# Patient Record
Sex: MALE | Race: WHITE | NOT HISPANIC OR LATINO | Employment: OTHER | URBAN - NONMETROPOLITAN AREA
[De-identification: names, ages, dates, MRNs, and addresses within clinical notes are randomized per-mention and may not be internally consistent; named-entity substitution may affect disease eponyms.]

---

## 2023-03-27 DIAGNOSIS — I10 HYPERTENSION, UNSPECIFIED TYPE: ICD-10-CM

## 2023-03-27 RX ORDER — VALSARTAN AND HYDROCHLOROTHIAZIDE 160; 12.5 MG/1; MG/1
1 TABLET, FILM COATED ORAL DAILY
COMMUNITY
Start: 2021-09-15 | End: 2023-03-27 | Stop reason: SDUPTHER

## 2023-03-27 RX ORDER — VALSARTAN AND HYDROCHLOROTHIAZIDE 160; 12.5 MG/1; MG/1
1 TABLET, FILM COATED ORAL DAILY
Qty: 30 TABLET | Refills: 0 | Status: SHIPPED | OUTPATIENT
Start: 2023-03-27 | End: 2023-04-06 | Stop reason: SDUPTHER

## 2023-03-28 DIAGNOSIS — E11.69 TYPE 2 DIABETES MELLITUS WITH OTHER SPECIFIED COMPLICATION, UNSPECIFIED WHETHER LONG TERM INSULIN USE (MULTI): ICD-10-CM

## 2023-03-28 RX ORDER — BLOOD SUGAR DIAGNOSTIC
STRIP MISCELLANEOUS
COMMUNITY
End: 2023-03-28 | Stop reason: SDUPTHER

## 2023-03-28 RX ORDER — INSULIN DETEMIR 100 [IU]/ML
INJECTION, SOLUTION SUBCUTANEOUS
COMMUNITY
End: 2023-04-10 | Stop reason: SDUPTHER

## 2023-03-28 RX ORDER — ATORVASTATIN CALCIUM 20 MG/1
20 TABLET, FILM COATED ORAL NIGHTLY
COMMUNITY
End: 2023-04-05 | Stop reason: SDUPTHER

## 2023-03-28 RX ORDER — ASPIRIN 81 MG
325 TABLET,CHEWABLE ORAL DAILY
COMMUNITY
Start: 2022-09-29 | End: 2023-06-21 | Stop reason: WASHOUT

## 2023-03-28 RX ORDER — METFORMIN HYDROCHLORIDE 1000 MG/1
TABLET ORAL
COMMUNITY
End: 2023-04-05 | Stop reason: SDUPTHER

## 2023-03-28 RX ORDER — BLOOD SUGAR DIAGNOSTIC
STRIP MISCELLANEOUS
Qty: 100 STRIP | Refills: 2 | Status: SHIPPED | OUTPATIENT
Start: 2023-03-28 | End: 2023-06-21 | Stop reason: SDUPTHER

## 2023-03-28 RX ORDER — LISINOPRIL 40 MG/1
1 TABLET ORAL DAILY
COMMUNITY
End: 2023-06-21 | Stop reason: WASHOUT

## 2023-03-28 RX ORDER — PEN NEEDLE, DIABETIC 29 G X1/2"
NEEDLE, DISPOSABLE MISCELLANEOUS
COMMUNITY
Start: 2022-09-11 | End: 2023-04-05 | Stop reason: SDUPTHER

## 2023-03-28 RX ORDER — INSULIN LISPRO 100 [IU]/ML
INJECTION, SOLUTION INTRAVENOUS; SUBCUTANEOUS
COMMUNITY
End: 2023-12-21 | Stop reason: SDUPTHER

## 2023-04-04 PROBLEM — M54.50 LOWER BACK PAIN: Status: ACTIVE | Noted: 2023-04-04

## 2023-04-04 PROBLEM — G47.00 INSOMNIA: Status: ACTIVE | Noted: 2023-04-04

## 2023-04-04 PROBLEM — I20.89 EXERTIONAL ANGINA (CMS-HCC): Status: ACTIVE | Noted: 2023-04-04

## 2023-04-04 PROBLEM — G89.29 CHRONIC BACK PAIN: Status: ACTIVE | Noted: 2023-04-04

## 2023-04-04 PROBLEM — M54.2 NECK PAIN: Status: ACTIVE | Noted: 2023-04-04

## 2023-04-04 PROBLEM — M19.90 OA (OSTEOARTHRITIS): Status: ACTIVE | Noted: 2023-04-04

## 2023-04-04 PROBLEM — N52.9 ERECTILE DYSFUNCTION: Status: ACTIVE | Noted: 2023-04-04

## 2023-04-04 PROBLEM — C61 PROSTATE CANCER (MULTI): Status: ACTIVE | Noted: 2023-04-04

## 2023-04-04 PROBLEM — E11.49 DIABETES WITH NEUROLOGIC COMPLICATIONS (MULTI): Status: ACTIVE | Noted: 2023-04-04

## 2023-04-04 PROBLEM — N39.0 URINARY TRACT INFECTION: Status: ACTIVE | Noted: 2023-04-04

## 2023-04-04 PROBLEM — M72.0 DUPUYTREN'S CONTRACTURE OF HAND: Status: ACTIVE | Noted: 2023-04-04

## 2023-04-04 PROBLEM — R39.9 URINARY SYMPTOM OR SIGN: Status: ACTIVE | Noted: 2023-04-04

## 2023-04-04 PROBLEM — E87.5 HYPERKALEMIA: Status: ACTIVE | Noted: 2023-04-04

## 2023-04-04 PROBLEM — J30.9 ALLERGIC RHINITIS: Status: ACTIVE | Noted: 2023-04-04

## 2023-04-04 PROBLEM — I10 BENIGN ESSENTIAL HYPERTENSION: Status: ACTIVE | Noted: 2023-04-04

## 2023-04-04 PROBLEM — M25.511 RIGHT SHOULDER PAIN: Status: ACTIVE | Noted: 2023-04-04

## 2023-04-04 PROBLEM — M54.9 CHRONIC BACK PAIN: Status: ACTIVE | Noted: 2023-04-04

## 2023-04-04 PROBLEM — M17.9 DJD (DEGENERATIVE JOINT DISEASE) OF KNEE: Status: ACTIVE | Noted: 2023-04-04

## 2023-04-04 PROBLEM — Z86.39 HISTORY OF UNCONTROLLED DIABETES: Status: ACTIVE | Noted: 2023-04-04

## 2023-04-04 PROBLEM — R11.0 NAUSEA: Status: ACTIVE | Noted: 2023-04-04

## 2023-04-04 PROBLEM — E78.5 HYPERLIPIDEMIA: Status: ACTIVE | Noted: 2023-04-04

## 2023-04-04 PROBLEM — E55.9 VITAMIN D DEFICIENCY: Status: ACTIVE | Noted: 2023-04-04

## 2023-04-04 PROBLEM — N18.31 STAGE 3A CHRONIC KIDNEY DISEASE (MULTI): Status: ACTIVE | Noted: 2023-04-04

## 2023-04-04 PROBLEM — N52.9 MALE ERECTILE DISORDER OF ORGANIC ORIGIN: Status: ACTIVE | Noted: 2023-04-04

## 2023-04-04 PROBLEM — F10.10 ALCOHOL ABUSE: Status: ACTIVE | Noted: 2023-04-04

## 2023-04-04 PROBLEM — F41.8 DEPRESSION WITH ANXIETY: Status: ACTIVE | Noted: 2023-04-04

## 2023-04-04 PROBLEM — R53.83 FATIGUE: Status: ACTIVE | Noted: 2023-04-04

## 2023-04-04 PROBLEM — R79.89 LOW SERUM TESTOSTERONE LEVEL: Status: ACTIVE | Noted: 2023-04-04

## 2023-04-05 ENCOUNTER — OFFICE VISIT (OUTPATIENT)
Dept: PRIMARY CARE | Facility: CLINIC | Age: 68
End: 2023-04-05
Payer: MEDICARE

## 2023-04-05 VITALS
BODY MASS INDEX: 31.16 KG/M2 | HEART RATE: 69 BPM | WEIGHT: 223.4 LBS | OXYGEN SATURATION: 98 % | SYSTOLIC BLOOD PRESSURE: 136 MMHG | DIASTOLIC BLOOD PRESSURE: 74 MMHG

## 2023-04-05 DIAGNOSIS — R52 PAIN: ICD-10-CM

## 2023-04-05 DIAGNOSIS — E11.69 TYPE 2 DIABETES MELLITUS WITH OTHER SPECIFIED COMPLICATION, WITH LONG-TERM CURRENT USE OF INSULIN (MULTI): ICD-10-CM

## 2023-04-05 DIAGNOSIS — M54.41 ACUTE RIGHT-SIDED LOW BACK PAIN WITH RIGHT-SIDED SCIATICA: Primary | ICD-10-CM

## 2023-04-05 DIAGNOSIS — E78.00 HYPERCHOLESTEROLEMIA: ICD-10-CM

## 2023-04-05 DIAGNOSIS — I10 BENIGN ESSENTIAL HYPERTENSION: ICD-10-CM

## 2023-04-05 DIAGNOSIS — G89.29 OTHER CHRONIC BACK PAIN: ICD-10-CM

## 2023-04-05 DIAGNOSIS — M54.9 OTHER CHRONIC BACK PAIN: ICD-10-CM

## 2023-04-05 DIAGNOSIS — C61 PROSTATE CANCER (MULTI): ICD-10-CM

## 2023-04-05 DIAGNOSIS — Z79.4 TYPE 2 DIABETES MELLITUS WITH OTHER SPECIFIED COMPLICATION, WITH LONG-TERM CURRENT USE OF INSULIN (MULTI): ICD-10-CM

## 2023-04-05 DIAGNOSIS — I10 HYPERTENSION, UNSPECIFIED TYPE: ICD-10-CM

## 2023-04-05 DIAGNOSIS — Z86.010 HISTORY OF COLON POLYPS: ICD-10-CM

## 2023-04-05 PROCEDURE — 3078F DIAST BP <80 MM HG: CPT | Performed by: INTERNAL MEDICINE

## 2023-04-05 PROCEDURE — 4010F ACE/ARB THERAPY RXD/TAKEN: CPT | Performed by: INTERNAL MEDICINE

## 2023-04-05 PROCEDURE — 3075F SYST BP GE 130 - 139MM HG: CPT | Performed by: INTERNAL MEDICINE

## 2023-04-05 PROCEDURE — 1160F RVW MEDS BY RX/DR IN RCRD: CPT | Performed by: INTERNAL MEDICINE

## 2023-04-05 PROCEDURE — 1036F TOBACCO NON-USER: CPT | Performed by: INTERNAL MEDICINE

## 2023-04-05 PROCEDURE — 99214 OFFICE O/P EST MOD 30 MIN: CPT | Performed by: INTERNAL MEDICINE

## 2023-04-05 PROCEDURE — 1159F MED LIST DOCD IN RCRD: CPT | Performed by: INTERNAL MEDICINE

## 2023-04-05 RX ORDER — ATORVASTATIN CALCIUM 20 MG/1
20 TABLET, FILM COATED ORAL NIGHTLY
Qty: 90 TABLET | Refills: 0 | Status: SHIPPED | OUTPATIENT
Start: 2023-04-05 | End: 2023-08-01 | Stop reason: SDUPTHER

## 2023-04-05 RX ORDER — PEN NEEDLE, DIABETIC 29 G X1/2"
NEEDLE, DISPOSABLE MISCELLANEOUS
Qty: 100 EACH | Refills: 1 | Status: SHIPPED | OUTPATIENT
Start: 2023-04-05 | End: 2023-07-14 | Stop reason: SDUPTHER

## 2023-04-05 RX ORDER — METFORMIN HYDROCHLORIDE 1000 MG/1
TABLET ORAL
Qty: 180 TABLET | Refills: 0 | Status: SHIPPED | OUTPATIENT
Start: 2023-04-05 | End: 2023-04-25 | Stop reason: SDUPTHER

## 2023-04-05 ASSESSMENT — ENCOUNTER SYMPTOMS
LOSS OF SENSATION IN FEET: 0
OCCASIONAL FEELINGS OF UNSTEADINESS: 1
DEPRESSION: 0

## 2023-04-05 NOTE — PROGRESS NOTES
Subjective   Patient ID: Tyshawn Wilkerson is a 67 y.o. male who presents for Follow-up (3 mth medical management /Major lower back pain, right hip pain ).  HPI    Patient presented today for routine follow-up    Complaining of back pain predominantly right-sided with right-sided hip pain.  Patient denies any recent falls.    DM  this a.m. on insulin no side effects    Hypercholesterolemia on therapy no side effects    Hypertension on therapy no side effects    Prostate cancer    Diet and exercise reviewed    Review of Systems   All other systems reviewed and are negative.      Objective   Physical Exam  Vitals reviewed.   Constitutional:       Appearance: Normal appearance. He is normal weight.   HENT:      Head: Normocephalic and atraumatic.      Mouth/Throat:      Pharynx: No posterior oropharyngeal erythema.   Eyes:      General: No scleral icterus.     Conjunctiva/sclera: Conjunctivae normal.      Pupils: Pupils are equal, round, and reactive to light.   Cardiovascular:      Rate and Rhythm: Normal rate and regular rhythm.      Heart sounds: Normal heart sounds.   Pulmonary:      Effort: No respiratory distress.      Breath sounds: No wheezing.   Abdominal:      General: Abdomen is flat. Bowel sounds are normal. There is no distension.      Palpations: Abdomen is soft. There is no mass.      Tenderness: There is no abdominal tenderness. There is no rebound.   Musculoskeletal:         General: Normal range of motion.      Cervical back: Normal range of motion and neck supple.   Skin:     General: Skin is warm and dry.   Neurological:      General: No focal deficit present.      Mental Status: He is alert and oriented to person, place, and time. Mental status is at baseline.   Psychiatric:         Mood and Affect: Mood normal.         Behavior: Behavior normal.         Thought Content: Thought content normal.         Judgment: Judgment normal.         Assessment/Plan   Problem List Items Addressed This  "Visit          Circulatory    Benign essential hypertension       Genitourinary    Prostate cancer (CMS/HCC)       Other    Chronic back pain    Relevant Orders    PT eval and treat    Lower back pain - Primary     Other Visit Diagnoses       Pain        Type 2 diabetes mellitus with other specified complication, with long-term current use of insulin (CMS/HCC)        Relevant Medications    metFORMIN (Glucophage) 1,000 mg tablet    BD Insulin Syringe Ultra-Fine 0.3 mL 31 gauge x 5/16\" syringe    History of colon polyps        Relevant Orders    Referral to Gastroenterology    Hypercholesterolemia        Relevant Medications    atorvastatin (Lipitor) 20 mg tablet          Complaining of back pain predominantly right-sided with right-sided hip pain.  Patient denies any recent falls. Will check x rays    DM  this a.m. on insulin no side effects    Hypercholesterolemia on therapy no side effects    Hypertension on therapy no side effects    Prostate cancer    Diet and exercise reviewed    Follow up colonoscopy    Follow up 6 weeks / welcome to medicare     "

## 2023-04-06 RX ORDER — VALSARTAN AND HYDROCHLOROTHIAZIDE 160; 12.5 MG/1; MG/1
1 TABLET, FILM COATED ORAL DAILY
Qty: 90 TABLET | Refills: 0 | Status: SHIPPED | OUTPATIENT
Start: 2023-04-06 | End: 2023-04-26 | Stop reason: SDUPTHER

## 2023-04-07 DIAGNOSIS — M54.41 CHRONIC RIGHT-SIDED LOW BACK PAIN WITH RIGHT-SIDED SCIATICA: Primary | ICD-10-CM

## 2023-04-07 DIAGNOSIS — M25.551 PAIN OF RIGHT HIP: ICD-10-CM

## 2023-04-07 DIAGNOSIS — G89.29 CHRONIC RIGHT-SIDED LOW BACK PAIN WITH RIGHT-SIDED SCIATICA: Primary | ICD-10-CM

## 2023-04-10 DIAGNOSIS — Z86.39 HISTORY OF UNCONTROLLED DIABETES: ICD-10-CM

## 2023-04-10 RX ORDER — INSULIN DETEMIR 100 [IU]/ML
50 INJECTION, SOLUTION SUBCUTANEOUS NIGHTLY
Qty: 10 ML | Refills: 2 | Status: SHIPPED | OUTPATIENT
Start: 2023-04-10 | End: 2023-06-12 | Stop reason: SDUPTHER

## 2023-04-25 DIAGNOSIS — E11.69 TYPE 2 DIABETES MELLITUS WITH OTHER SPECIFIED COMPLICATION, WITH LONG-TERM CURRENT USE OF INSULIN (MULTI): ICD-10-CM

## 2023-04-25 DIAGNOSIS — Z79.4 TYPE 2 DIABETES MELLITUS WITH OTHER SPECIFIED COMPLICATION, WITH LONG-TERM CURRENT USE OF INSULIN (MULTI): ICD-10-CM

## 2023-04-25 RX ORDER — METFORMIN HYDROCHLORIDE 1000 MG/1
TABLET ORAL
Qty: 180 TABLET | Refills: 0 | Status: SHIPPED | OUTPATIENT
Start: 2023-04-25 | End: 2023-10-02

## 2023-04-26 DIAGNOSIS — I10 BENIGN ESSENTIAL HYPERTENSION: ICD-10-CM

## 2023-04-26 DIAGNOSIS — I10 HYPERTENSION, UNSPECIFIED TYPE: ICD-10-CM

## 2023-04-26 RX ORDER — VALSARTAN AND HYDROCHLOROTHIAZIDE 160; 12.5 MG/1; MG/1
1 TABLET, FILM COATED ORAL DAILY
Qty: 30 TABLET | Refills: 0 | Status: SHIPPED | OUTPATIENT
Start: 2023-04-26 | End: 2023-05-15 | Stop reason: SDUPTHER

## 2023-05-15 DIAGNOSIS — I10 HYPERTENSION, UNSPECIFIED TYPE: ICD-10-CM

## 2023-05-15 DIAGNOSIS — I10 BENIGN ESSENTIAL HYPERTENSION: ICD-10-CM

## 2023-05-15 RX ORDER — VALSARTAN AND HYDROCHLOROTHIAZIDE 160; 12.5 MG/1; MG/1
1 TABLET, FILM COATED ORAL DAILY
Qty: 30 TABLET | Refills: 0 | Status: SHIPPED | OUTPATIENT
Start: 2023-05-15 | End: 2023-10-10 | Stop reason: SDUPTHER

## 2023-05-16 PROBLEM — R31.29 MICROSCOPIC HEMATURIA: Status: ACTIVE | Noted: 2021-03-10

## 2023-05-16 PROBLEM — R97.20 ELEVATED PROSTATE SPECIFIC ANTIGEN (PSA): Status: ACTIVE | Noted: 2020-06-17

## 2023-05-16 PROBLEM — F32.A DEPRESSION: Status: ACTIVE | Noted: 2019-09-30

## 2023-05-16 PROBLEM — M19.90 OSTEOARTHRITIS: Status: ACTIVE | Noted: 2019-09-30

## 2023-05-16 PROBLEM — I10 ESSENTIAL HYPERTENSION: Status: ACTIVE | Noted: 2019-09-30

## 2023-05-16 PROBLEM — E78.5 HYPERLIPIDEMIA: Status: ACTIVE | Noted: 2019-09-30

## 2023-05-16 RX ORDER — TADALAFIL 5 MG/1
TABLET ORAL
COMMUNITY
Start: 2021-03-10 | End: 2023-06-21 | Stop reason: WASHOUT

## 2023-05-16 RX ORDER — SYRINGE AND NEEDLE,INSULIN,1ML 28GX1/2"
SYRINGE, EMPTY DISPOSABLE MISCELLANEOUS
COMMUNITY
Start: 2023-04-07 | End: 2023-05-25 | Stop reason: ALTCHOICE

## 2023-05-25 ENCOUNTER — OFFICE VISIT (OUTPATIENT)
Dept: PRIMARY CARE | Facility: CLINIC | Age: 68
End: 2023-05-25
Payer: MEDICARE

## 2023-05-25 VITALS
OXYGEN SATURATION: 98 % | SYSTOLIC BLOOD PRESSURE: 138 MMHG | BODY MASS INDEX: 30.38 KG/M2 | DIASTOLIC BLOOD PRESSURE: 94 MMHG | HEART RATE: 81 BPM | WEIGHT: 217.8 LBS

## 2023-05-25 DIAGNOSIS — M54.41 ACUTE RIGHT-SIDED LOW BACK PAIN WITH RIGHT-SIDED SCIATICA: Primary | ICD-10-CM

## 2023-05-25 DIAGNOSIS — Z79.4 TYPE 2 DIABETES MELLITUS WITH DIABETIC POLYNEUROPATHY, WITH LONG-TERM CURRENT USE OF INSULIN (MULTI): ICD-10-CM

## 2023-05-25 DIAGNOSIS — F41.9 ANXIETY: ICD-10-CM

## 2023-05-25 DIAGNOSIS — E66.09 CLASS 1 OBESITY DUE TO EXCESS CALORIES WITH SERIOUS COMORBIDITY AND BODY MASS INDEX (BMI) OF 30.0 TO 30.9 IN ADULT: ICD-10-CM

## 2023-05-25 DIAGNOSIS — C61 PROSTATE CANCER (MULTI): ICD-10-CM

## 2023-05-25 DIAGNOSIS — R19.7 DIARRHEA, UNSPECIFIED TYPE: ICD-10-CM

## 2023-05-25 DIAGNOSIS — I10 ESSENTIAL HYPERTENSION: ICD-10-CM

## 2023-05-25 DIAGNOSIS — E78.5 HYPERLIPIDEMIA, UNSPECIFIED HYPERLIPIDEMIA TYPE: ICD-10-CM

## 2023-05-25 DIAGNOSIS — E11.42 TYPE 2 DIABETES MELLITUS WITH DIABETIC POLYNEUROPATHY, WITH LONG-TERM CURRENT USE OF INSULIN (MULTI): ICD-10-CM

## 2023-05-25 PROCEDURE — 3080F DIAST BP >= 90 MM HG: CPT | Performed by: INTERNAL MEDICINE

## 2023-05-25 PROCEDURE — 99214 OFFICE O/P EST MOD 30 MIN: CPT | Performed by: INTERNAL MEDICINE

## 2023-05-25 PROCEDURE — 1159F MED LIST DOCD IN RCRD: CPT | Performed by: INTERNAL MEDICINE

## 2023-05-25 PROCEDURE — 1036F TOBACCO NON-USER: CPT | Performed by: INTERNAL MEDICINE

## 2023-05-25 PROCEDURE — 3008F BODY MASS INDEX DOCD: CPT | Performed by: INTERNAL MEDICINE

## 2023-05-25 PROCEDURE — 3075F SYST BP GE 130 - 139MM HG: CPT | Performed by: INTERNAL MEDICINE

## 2023-05-25 PROCEDURE — 4010F ACE/ARB THERAPY RXD/TAKEN: CPT | Performed by: INTERNAL MEDICINE

## 2023-05-25 PROCEDURE — 1160F RVW MEDS BY RX/DR IN RCRD: CPT | Performed by: INTERNAL MEDICINE

## 2023-05-25 RX ORDER — DULOXETIN HYDROCHLORIDE 30 MG/1
30 CAPSULE, DELAYED RELEASE ORAL DAILY
Qty: 30 CAPSULE | Refills: 0 | Status: SHIPPED | OUTPATIENT
Start: 2023-05-25 | End: 2023-06-21 | Stop reason: SDUPTHER

## 2023-05-25 ASSESSMENT — ENCOUNTER SYMPTOMS
BACK PAIN: 1
DIARRHEA: 1
HYPERTENSION: 1

## 2023-05-25 NOTE — PROGRESS NOTES
Subjective   Patient ID: Tyshawn Wilkerson is a 67 y.o. male who presents for Follow-up (6 wks), Diabetes Mellitus, Hypertension, Hyperlipidemia, Back Pain (Lower back and buttocks pain), and Diarrhea (Black, constant ).  Hypertension    Hyperlipidemia    Back Pain    Diarrhea     Follow up    back pain predominantly right-sided with right-sided hip pain.  Patient denies any recent falls. x rays neg     DM  this a.m. on insulin no side effects    Diarrhea using pepto bismol  Colonoscopy not scheduled yet    Mom passed away few days ago  stressed     Hypercholesterolemia on therapy no side effects     Hypertension on therapy no side effects     Prostate cancer     Diet and exercise reviewed     Review of Systems   Gastrointestinal:  Positive for diarrhea.   Musculoskeletal:  Positive for back pain.       Objective   BP (!) 138/94   Pulse 81   Wt 98.8 kg (217 lb 12.8 oz)   SpO2 98%   BMI 30.38 kg/m²   Lab Results   Component Value Date    WBC 5.6 09/15/2022    HGB 14.2 09/15/2022    HCT 40.9 (L) 09/15/2022     09/15/2022    CHOL 142 09/15/2022    TRIG 108 09/15/2022    HDL 72.0 09/15/2022    ALT 31 09/15/2022    AST 27 09/15/2022     09/15/2022    K 4.6 09/15/2022     09/15/2022    CREATININE 1.23 09/15/2022    BUN 14 09/15/2022    CO2 29 09/15/2022    TSH 1.51 08/10/2021    PSA <0.1 08/10/2021    INR 0.9 08/11/2020    HGBA1C 6.3 (A) 09/15/2022           Physical Exam  Vitals reviewed.   Constitutional:       Appearance: Normal appearance. He is obese.   HENT:      Head: Normocephalic and atraumatic.      Mouth/Throat:      Pharynx: No posterior oropharyngeal erythema.   Eyes:      General: No scleral icterus.     Conjunctiva/sclera: Conjunctivae normal.      Pupils: Pupils are equal, round, and reactive to light.   Cardiovascular:      Rate and Rhythm: Normal rate and regular rhythm.      Heart sounds: Normal heart sounds.   Pulmonary:      Effort: No respiratory distress.      Breath  sounds: No wheezing.   Abdominal:      General: Abdomen is flat. Bowel sounds are normal. There is no distension.      Palpations: Abdomen is soft. There is no mass.      Tenderness: There is no abdominal tenderness. There is no rebound.   Musculoskeletal:         General: Normal range of motion.      Cervical back: Normal range of motion and neck supple.   Skin:     General: Skin is warm and dry.   Neurological:      General: No focal deficit present.      Mental Status: He is alert and oriented to person, place, and time. Mental status is at baseline.   Psychiatric:         Mood and Affect: Mood normal.         Behavior: Behavior normal.         Thought Content: Thought content normal.         Judgment: Judgment normal.       Problem List Items Addressed This Visit          Nervous    Diabetes with neurologic complications (CMS/HCC)       Circulatory    Essential hypertension       Genitourinary    Prostate cancer (CMS/HCC)       Other    Lower back pain - Primary    Relevant Medications    DULoxetine (Cymbalta) 30 mg DR capsule    Hyperlipidemia     Other Visit Diagnoses       Anxiety        Relevant Medications    DULoxetine (Cymbalta) 30 mg DR capsule    Diarrhea, unspecified type        Class 1 obesity due to excess calories with serious comorbidity and body mass index (BMI) of 30.0 to 30.9 in adult              Assessment/Plan     back pain predominantly right-sided with right-sided hip pain.  Patient denies any recent falls. x rays neg  Mom passed away few days ago  stressed  Cymbalta 30 mg daily  Risk / benefits rev'd     DM  this a.m. on insulin no side effects  Take insulin with food not snack    Diarrhea using pepto bismol  H/o colon polyps  Colonoscopy not scheduled yet     Hypercholesterolemia on therapy no side effects     Hypertension high on therapy no side effects  Follow closely     Prostate cancer     Diet and exercise reviewed    Follow up 1 month / welcome to medicare

## 2023-06-12 DIAGNOSIS — Z86.39 HISTORY OF UNCONTROLLED DIABETES: ICD-10-CM

## 2023-06-12 RX ORDER — INSULIN DETEMIR 100 [IU]/ML
50 INJECTION, SOLUTION SUBCUTANEOUS NIGHTLY
Qty: 10 ML | Refills: 2 | Status: SHIPPED | OUTPATIENT
Start: 2023-06-12 | End: 2023-08-28 | Stop reason: SDUPTHER

## 2023-06-20 DIAGNOSIS — M54.41 ACUTE RIGHT-SIDED LOW BACK PAIN WITH RIGHT-SIDED SCIATICA: ICD-10-CM

## 2023-06-20 DIAGNOSIS — F41.9 ANXIETY: ICD-10-CM

## 2023-06-20 DIAGNOSIS — E11.69 TYPE 2 DIABETES MELLITUS WITH OTHER SPECIFIED COMPLICATION, UNSPECIFIED WHETHER LONG TERM INSULIN USE (MULTI): ICD-10-CM

## 2023-06-20 RX ORDER — BLOOD SUGAR DIAGNOSTIC
STRIP MISCELLANEOUS
Qty: 100 STRIP | Refills: 2 | Status: CANCELLED | OUTPATIENT
Start: 2023-06-20

## 2023-06-20 RX ORDER — DULOXETIN HYDROCHLORIDE 30 MG/1
30 CAPSULE, DELAYED RELEASE ORAL DAILY
Qty: 30 CAPSULE | Refills: 0 | Status: CANCELLED | OUTPATIENT
Start: 2023-06-20 | End: 2024-06-19

## 2023-06-21 ENCOUNTER — OFFICE VISIT (OUTPATIENT)
Dept: PRIMARY CARE | Facility: CLINIC | Age: 68
End: 2023-06-21
Payer: MEDICARE

## 2023-06-21 VITALS
SYSTOLIC BLOOD PRESSURE: 124 MMHG | DIASTOLIC BLOOD PRESSURE: 82 MMHG | HEART RATE: 75 BPM | HEIGHT: 70 IN | BODY MASS INDEX: 30.98 KG/M2 | OXYGEN SATURATION: 97 % | WEIGHT: 216.4 LBS

## 2023-06-21 DIAGNOSIS — E78.5 HYPERLIPIDEMIA, UNSPECIFIED HYPERLIPIDEMIA TYPE: ICD-10-CM

## 2023-06-21 DIAGNOSIS — Z00.00 WELCOME TO MEDICARE PREVENTIVE VISIT: Primary | ICD-10-CM

## 2023-06-21 DIAGNOSIS — M54.41 ACUTE RIGHT-SIDED LOW BACK PAIN WITH RIGHT-SIDED SCIATICA: ICD-10-CM

## 2023-06-21 DIAGNOSIS — I10 ESSENTIAL HYPERTENSION: ICD-10-CM

## 2023-06-21 DIAGNOSIS — C61 PROSTATE CANCER (MULTI): ICD-10-CM

## 2023-06-21 DIAGNOSIS — R19.7 DIARRHEA, UNSPECIFIED TYPE: ICD-10-CM

## 2023-06-21 DIAGNOSIS — E11.69 TYPE 2 DIABETES MELLITUS WITH OTHER SPECIFIED COMPLICATION, UNSPECIFIED WHETHER LONG TERM INSULIN USE (MULTI): ICD-10-CM

## 2023-06-21 DIAGNOSIS — E66.09 CLASS 1 OBESITY DUE TO EXCESS CALORIES WITH SERIOUS COMORBIDITY AND BODY MASS INDEX (BMI) OF 31.0 TO 31.9 IN ADULT: ICD-10-CM

## 2023-06-21 PROCEDURE — G0402 INITIAL PREVENTIVE EXAM: HCPCS | Performed by: INTERNAL MEDICINE

## 2023-06-21 PROCEDURE — 3074F SYST BP LT 130 MM HG: CPT | Performed by: INTERNAL MEDICINE

## 2023-06-21 PROCEDURE — G0403 EKG FOR INITIAL PREVENT EXAM: HCPCS | Performed by: INTERNAL MEDICINE

## 2023-06-21 PROCEDURE — 3079F DIAST BP 80-89 MM HG: CPT | Performed by: INTERNAL MEDICINE

## 2023-06-21 PROCEDURE — 1036F TOBACCO NON-USER: CPT | Performed by: INTERNAL MEDICINE

## 2023-06-21 PROCEDURE — 1170F FXNL STATUS ASSESSED: CPT | Performed by: INTERNAL MEDICINE

## 2023-06-21 PROCEDURE — 3008F BODY MASS INDEX DOCD: CPT | Performed by: INTERNAL MEDICINE

## 2023-06-21 PROCEDURE — 1160F RVW MEDS BY RX/DR IN RCRD: CPT | Performed by: INTERNAL MEDICINE

## 2023-06-21 PROCEDURE — 99214 OFFICE O/P EST MOD 30 MIN: CPT | Performed by: INTERNAL MEDICINE

## 2023-06-21 PROCEDURE — 1159F MED LIST DOCD IN RCRD: CPT | Performed by: INTERNAL MEDICINE

## 2023-06-21 RX ORDER — BLOOD SUGAR DIAGNOSTIC
STRIP MISCELLANEOUS
Qty: 100 STRIP | Refills: 11 | Status: SHIPPED | OUTPATIENT
Start: 2023-06-21 | End: 2023-12-21 | Stop reason: SDUPTHER

## 2023-06-21 RX ORDER — SYRINGE AND NEEDLE,INSULIN,1ML 28GX1/2"
SYRINGE, EMPTY DISPOSABLE MISCELLANEOUS
COMMUNITY
Start: 2023-05-27 | End: 2023-11-16 | Stop reason: SDUPTHER

## 2023-06-21 RX ORDER — DULOXETIN HYDROCHLORIDE 30 MG/1
30 CAPSULE, DELAYED RELEASE ORAL DAILY
Qty: 30 CAPSULE | Refills: 2 | Status: SHIPPED | OUTPATIENT
Start: 2023-06-21 | End: 2023-06-23 | Stop reason: SDUPTHER

## 2023-06-21 ASSESSMENT — ENCOUNTER SYMPTOMS
OCCASIONAL FEELINGS OF UNSTEADINESS: 1
LOSS OF SENSATION IN FEET: 1
DEPRESSION: 0

## 2023-06-21 ASSESSMENT — VISUAL ACUITY
OS_CC: 20/20
OD_CC: 20/25-1

## 2023-06-21 ASSESSMENT — PATIENT HEALTH QUESTIONNAIRE - PHQ9
1. LITTLE INTEREST OR PLEASURE IN DOING THINGS: SEVERAL DAYS
SUM OF ALL RESPONSES TO PHQ9 QUESTIONS 1 AND 2: 1
2. FEELING DOWN, DEPRESSED OR HOPELESS: NOT AT ALL
10. IF YOU CHECKED OFF ANY PROBLEMS, HOW DIFFICULT HAVE THESE PROBLEMS MADE IT FOR YOU TO DO YOUR WORK, TAKE CARE OF THINGS AT HOME, OR GET ALONG WITH OTHER PEOPLE: SOMEWHAT DIFFICULT

## 2023-06-21 ASSESSMENT — ACTIVITIES OF DAILY LIVING (ADL)
DRESSING: INDEPENDENT
TAKING_MEDICATION: INDEPENDENT
DOING_HOUSEWORK: INDEPENDENT
BATHING: INDEPENDENT
MANAGING_FINANCES: INDEPENDENT
GROCERY_SHOPPING: INDEPENDENT

## 2023-06-22 NOTE — PROGRESS NOTES
"Subjective   Reason for Visit: Tyshawn Wilkerson is an 67 y.o. male here for a Medicare Wellness visit and follow up    Past Medical, Surgical, and Family History reviewed and updated in chart.         HPI    Follow up    back pain predominantly right-sided with right-sided hip pain better with cymbalta  No side effects      DM FBS not checked  this a.m. on insulin better no side effects     Diarrhea using pepto bismol  H/o colon polyps  Colonoscopy not scheduled yet     Hypercholesterolemia on therapy no side effects     Hypertension stable on therapy no side effects    Prostate cancer  Urology following     Diet and exercise reviewed    Patient Care Team:  Jessica Moralez MD as PCP - General  Jessica Moralez MD as PCP - [employer] PCP     Review of Systems   All other systems reviewed and are negative.      Objective   Vitals:  /82   Pulse 75   Ht 1.778 m (5' 10\")   Wt 98.2 kg (216 lb 6.4 oz)   SpO2 97%   BMI 31.05 kg/m²       Physical Exam  Vitals reviewed.   Constitutional:       Appearance: Normal appearance. He is obese.   HENT:      Head: Normocephalic and atraumatic.      Mouth/Throat:      Pharynx: No posterior oropharyngeal erythema.   Eyes:      General: No scleral icterus.     Conjunctiva/sclera: Conjunctivae normal.      Pupils: Pupils are equal, round, and reactive to light.   Cardiovascular:      Rate and Rhythm: Normal rate and regular rhythm.      Heart sounds: Normal heart sounds.   Pulmonary:      Effort: No respiratory distress.      Breath sounds: No wheezing.   Abdominal:      General: Abdomen is flat. Bowel sounds are normal. There is no distension.      Palpations: Abdomen is soft. There is no mass.      Tenderness: There is no abdominal tenderness. There is no rebound.   Musculoskeletal:         General: Normal range of motion.      Cervical back: Normal range of motion and neck supple.   Skin:     General: Skin is warm and dry.   Neurological:      " General: No focal deficit present.      Mental Status: He is alert and oriented to person, place, and time. Mental status is at baseline.   Psychiatric:         Mood and Affect: Mood normal.         Behavior: Behavior normal.         Thought Content: Thought content normal.         Judgment: Judgment normal.         Assessment/Plan   Problem List Items Addressed This Visit          Circulatory    Essential hypertension    Overview     Last Assessment & Plan: Formatting of this note might be different from the original. Assessment: 161/83 in [facility], pt has no yet taken lisinopril today         Relevant Orders    ECG 12 lead (Clinic Performed) (Completed)       Genitourinary    Prostate cancer (CMS/HCC)       Other    Lower back pain    Relevant Medications    DULoxetine (Cymbalta) 30 mg DR capsule    Hyperlipidemia    Overview     Last Assessment & Plan: Formatting of this note might be different from the original. Assessment: taking statin         Relevant Orders    ECG 12 lead (Clinic Performed) (Completed)     Other Visit Diagnoses       Welcome to Medicare preventive visit    -  Primary    Relevant Orders    ECG 12 lead (Clinic Performed) (Completed)    ECG 12 lead (Clinic Performed)    ECG 12 lead (Completed)    1 Year Follow Up In Primary Care - Wellness Exam    Type 2 diabetes mellitus with other specified complication, unspecified whether long term insulin use (CMS/[facility])        Relevant Medications    Accu-Chek Rosie Plus test strp strip    Diarrhea, unspecified type        Class 1 obesity due to excess calories with serious comorbidity and body mass index (BMI) of 31.0 to 31.9 in adult              back pain predominantly right-sided with right-sided hip pain better with cymbalta  No side effects      DM FBS not checked  this a.m. on insulin better no side effects     Diarrhea using pepto bismol  H/o colon polyps  Colonoscopy not scheduled yet     Hypercholesterolemia on therapy no side effects     Hypertension  stable on therapy no side effects  EKG ok    Prostate cancer  Urology following     Diet and exercise reviewed    Follow up 3 months / yearly physical

## 2023-06-23 DIAGNOSIS — M54.41 ACUTE RIGHT-SIDED LOW BACK PAIN WITH RIGHT-SIDED SCIATICA: ICD-10-CM

## 2023-06-25 RX ORDER — DULOXETIN HYDROCHLORIDE 30 MG/1
30 CAPSULE, DELAYED RELEASE ORAL DAILY
Qty: 90 CAPSULE | Refills: 0 | Status: SHIPPED | OUTPATIENT
Start: 2023-06-25 | End: 2023-09-21 | Stop reason: SDUPTHER

## 2023-07-14 DIAGNOSIS — E11.69 TYPE 2 DIABETES MELLITUS WITH OTHER SPECIFIED COMPLICATION, WITH LONG-TERM CURRENT USE OF INSULIN (MULTI): ICD-10-CM

## 2023-07-14 DIAGNOSIS — Z79.4 TYPE 2 DIABETES MELLITUS WITH OTHER SPECIFIED COMPLICATION, WITH LONG-TERM CURRENT USE OF INSULIN (MULTI): ICD-10-CM

## 2023-07-16 RX ORDER — PEN NEEDLE, DIABETIC 29 G X1/2"
NEEDLE, DISPOSABLE MISCELLANEOUS
Qty: 100 EACH | Refills: 3 | Status: SHIPPED | OUTPATIENT
Start: 2023-07-16 | End: 2023-11-16 | Stop reason: SDUPTHER

## 2023-08-01 DIAGNOSIS — E78.00 HYPERCHOLESTEROLEMIA: ICD-10-CM

## 2023-08-01 RX ORDER — ATORVASTATIN CALCIUM 20 MG/1
20 TABLET, FILM COATED ORAL NIGHTLY
Qty: 30 TABLET | Refills: 1 | Status: SHIPPED | OUTPATIENT
Start: 2023-08-01 | End: 2023-08-10 | Stop reason: SDUPTHER

## 2023-08-10 DIAGNOSIS — E78.00 HYPERCHOLESTEROLEMIA: ICD-10-CM

## 2023-08-11 RX ORDER — ATORVASTATIN CALCIUM 20 MG/1
20 TABLET, FILM COATED ORAL NIGHTLY
Qty: 30 TABLET | Refills: 0 | Status: SHIPPED | OUTPATIENT
Start: 2023-08-11 | End: 2023-09-26 | Stop reason: SDUPTHER

## 2023-08-28 DIAGNOSIS — Z86.39 HISTORY OF UNCONTROLLED DIABETES: ICD-10-CM

## 2023-08-29 RX ORDER — INSULIN DETEMIR 100 [IU]/ML
50 INJECTION, SOLUTION SUBCUTANEOUS NIGHTLY
Qty: 45 ML | Refills: 0 | Status: SHIPPED | OUTPATIENT
Start: 2023-08-29 | End: 2023-11-17 | Stop reason: SDUPTHER

## 2023-09-21 ENCOUNTER — OFFICE VISIT (OUTPATIENT)
Dept: PRIMARY CARE | Facility: CLINIC | Age: 68
End: 2023-09-21
Payer: MEDICARE

## 2023-09-21 VITALS
DIASTOLIC BLOOD PRESSURE: 76 MMHG | SYSTOLIC BLOOD PRESSURE: 126 MMHG | HEART RATE: 87 BPM | WEIGHT: 223 LBS | BODY MASS INDEX: 32 KG/M2 | OXYGEN SATURATION: 99 %

## 2023-09-21 DIAGNOSIS — M54.50 CHRONIC RIGHT-SIDED LOW BACK PAIN WITHOUT SCIATICA: ICD-10-CM

## 2023-09-21 DIAGNOSIS — G89.29 CHRONIC RIGHT-SIDED LOW BACK PAIN WITHOUT SCIATICA: ICD-10-CM

## 2023-09-21 DIAGNOSIS — I10 ESSENTIAL HYPERTENSION: ICD-10-CM

## 2023-09-21 DIAGNOSIS — F33.9 DEPRESSION, RECURRENT (CMS-HCC): ICD-10-CM

## 2023-09-21 DIAGNOSIS — Z12.5 SCREENING FOR PROSTATE CANCER: ICD-10-CM

## 2023-09-21 DIAGNOSIS — Z79.4 TYPE 2 DIABETES MELLITUS WITH DIABETIC POLYNEUROPATHY, WITH LONG-TERM CURRENT USE OF INSULIN (MULTI): ICD-10-CM

## 2023-09-21 DIAGNOSIS — N18.31 STAGE 3A CHRONIC KIDNEY DISEASE (MULTI): ICD-10-CM

## 2023-09-21 DIAGNOSIS — E66.09 CLASS 1 OBESITY DUE TO EXCESS CALORIES WITH SERIOUS COMORBIDITY AND BODY MASS INDEX (BMI) OF 32.0 TO 32.9 IN ADULT: ICD-10-CM

## 2023-09-21 DIAGNOSIS — E55.9 VITAMIN D DEFICIENCY: ICD-10-CM

## 2023-09-21 DIAGNOSIS — E11.42 TYPE 2 DIABETES MELLITUS WITH DIABETIC POLYNEUROPATHY, WITH LONG-TERM CURRENT USE OF INSULIN (MULTI): ICD-10-CM

## 2023-09-21 DIAGNOSIS — Z00.00 ANNUAL PHYSICAL EXAM: Primary | ICD-10-CM

## 2023-09-21 DIAGNOSIS — I20.89 EXERTIONAL ANGINA (CMS-HCC): ICD-10-CM

## 2023-09-21 DIAGNOSIS — E78.00 HYPERCHOLESTEREMIA: ICD-10-CM

## 2023-09-21 PROCEDURE — 1159F MED LIST DOCD IN RCRD: CPT | Performed by: INTERNAL MEDICINE

## 2023-09-21 PROCEDURE — 3008F BODY MASS INDEX DOCD: CPT | Performed by: INTERNAL MEDICINE

## 2023-09-21 PROCEDURE — 99397 PER PM REEVAL EST PAT 65+ YR: CPT | Performed by: INTERNAL MEDICINE

## 2023-09-21 PROCEDURE — 3074F SYST BP LT 130 MM HG: CPT | Performed by: INTERNAL MEDICINE

## 2023-09-21 PROCEDURE — 1160F RVW MEDS BY RX/DR IN RCRD: CPT | Performed by: INTERNAL MEDICINE

## 2023-09-21 PROCEDURE — 1036F TOBACCO NON-USER: CPT | Performed by: INTERNAL MEDICINE

## 2023-09-21 PROCEDURE — 99406 BEHAV CHNG SMOKING 3-10 MIN: CPT | Performed by: INTERNAL MEDICINE

## 2023-09-21 PROCEDURE — 3078F DIAST BP <80 MM HG: CPT | Performed by: INTERNAL MEDICINE

## 2023-09-21 PROCEDURE — 99215 OFFICE O/P EST HI 40 MIN: CPT | Performed by: INTERNAL MEDICINE

## 2023-09-21 RX ORDER — DULOXETIN HYDROCHLORIDE 60 MG/1
60 CAPSULE, DELAYED RELEASE ORAL DAILY
Qty: 90 CAPSULE | Refills: 0 | COMMUNITY
Start: 2023-09-21 | End: 2023-12-21 | Stop reason: ALTCHOICE

## 2023-09-21 NOTE — PROGRESS NOTES
Subjective   Patient ID: Tyshawn Wilkerson is a 68 y.o. male who presents for Med Management, Diabetes Mellitus, Hyperlipidemia, and Anxiety.  Hyperlipidemia    Anxiety        Yearly physical    Prostate 9-23  Colonoscopy pending  CT chest lung cancer screening n/a  immunizations rev'd  BMI 32    Follow up    back pain predominantly right-sided with right-sided hip pain better with cymbalta but wants dose increase to 60 mg daily  No side effects      DM FBS not checked  this a.m. on insulin better no side effects     Diarrhea using pepto bismol  H/o colon polyps  Colonoscopy not scheduled yet     Hypercholesterolemia on therapy no side effects     Hypertension stable on therapy no side effects     Prostate cancer  Didn't see urology     Diet and exercise reviewed    Review of Systems   All other systems reviewed and are negative.      Objective   /76   Pulse 87   Wt 101 kg (223 lb)   SpO2 99%   BMI 32.00 kg/m²   Lab Results   Component Value Date    WBC 5.6 09/15/2022    HGB 14.2 09/15/2022    HCT 40.9 (L) 09/15/2022     09/15/2022    CHOL 142 09/15/2022    TRIG 108 09/15/2022    HDL 72.0 09/15/2022    ALT 31 09/15/2022    AST 27 09/15/2022     09/15/2022    K 4.6 09/15/2022     09/15/2022    CREATININE 1.23 09/15/2022    BUN 14 09/15/2022    CO2 29 09/15/2022    TSH 1.51 08/10/2021    PSA <0.1 08/10/2021    INR 0.9 08/11/2020    HGBA1C 6.3 (A) 09/15/2022           Physical Exam  Vitals reviewed.   Constitutional:       Appearance: Normal appearance. He is obese.   HENT:      Head: Normocephalic and atraumatic.      Mouth/Throat:      Pharynx: No posterior oropharyngeal erythema.   Eyes:      General: No scleral icterus.     Conjunctiva/sclera: Conjunctivae normal.      Pupils: Pupils are equal, round, and reactive to light.   Cardiovascular:      Rate and Rhythm: Normal rate and regular rhythm.      Heart sounds: Normal heart sounds.      Comments: Bare feet  exam  Intact  DP2+/=  Monofilament 1+/= near toes 2+/= rest of foot  Pulmonary:      Effort: No respiratory distress.      Breath sounds: No wheezing.   Abdominal:      General: Abdomen is flat. Bowel sounds are normal. There is no distension.      Palpations: Abdomen is soft. There is no mass.      Tenderness: There is no abdominal tenderness. There is no rebound.   Musculoskeletal:         General: Normal range of motion.      Cervical back: Normal range of motion and neck supple.   Skin:     General: Skin is warm and dry.   Neurological:      General: No focal deficit present.      Mental Status: He is alert and oriented to person, place, and time. Mental status is at baseline.   Psychiatric:         Mood and Affect: Mood normal.         Behavior: Behavior normal.         Thought Content: Thought content normal.         Judgment: Judgment normal.         Problem List Items Addressed This Visit          Cardiac and Vasculature    Essential hypertension    Exertional angina (CMS/HCC)       Endocrine/Metabolic    Diabetes with neurologic complications (CMS/HCC)    Relevant Orders    Albumin , Urine Random    Hemoglobin A1C    Vitamin D deficiency    Relevant Orders    Vitamin D 25-Hydroxy,Total (for eval of Vitamin D levels)       Genitourinary and Reproductive    Stage 3a chronic kidney disease (CMS/HCC)       Mental Health    Depression, recurrent (CMS/HCC)    Relevant Orders    CBC and Auto Differential       Musculoskeletal and Injuries    Chronic back pain    Relevant Medications    DULoxetine (Cymbalta) 60 mg DR capsule     Other Visit Diagnoses       Annual physical exam    -  Primary    Relevant Orders    Comprehensive Metabolic Panel    Lipid Panel    TSH with reflex to Free T4 if abnormal    Hypercholesteremia        Screening for prostate cancer        Relevant Orders    PSA    Class 1 obesity due to excess calories with serious comorbidity and body mass index (BMI) of 32.0 to 32.9 in adult               Assessment/Plan     Yearly physical    Prostate 9-23  Colonoscopy pending  CT chest lung cancer screening n/a  immunizations rev'd  BMI 32    Follow up    back pain predominantly right-sided with right-sided hip pain better with cymbalta but wants dose increase to 60 mg daily  No side effects      DM FBS not checked  this a.m. on insulin better no side effects  Follow up optho     Diarrhea using pepto bismol  H/o colon polyps  Colonoscopy not scheduled yet     Hypercholesterolemia on therapy no side effects     Hypertension stable on therapy no side effects     Prostate cancer  Didn't see urology    Smokes marijuana drinks beer  smoking and alcohol  cessation discussed   I spent more  than 3 min but less than 10 min counselling pt about need for smoking / tobacco cessation  and how I can support efforts when pt is ready to quit      Diet and exercise reviewed    Check labs, urine before appt    Follow up 3 months

## 2023-09-26 DIAGNOSIS — E78.00 HYPERCHOLESTEROLEMIA: ICD-10-CM

## 2023-09-27 RX ORDER — ATORVASTATIN CALCIUM 20 MG/1
20 TABLET, FILM COATED ORAL NIGHTLY
Qty: 90 TABLET | Refills: 0 | Status: SHIPPED | OUTPATIENT
Start: 2023-09-27 | End: 2023-12-21 | Stop reason: SDUPTHER

## 2023-10-01 DIAGNOSIS — E11.69 TYPE 2 DIABETES MELLITUS WITH OTHER SPECIFIED COMPLICATION, WITH LONG-TERM CURRENT USE OF INSULIN (MULTI): ICD-10-CM

## 2023-10-01 DIAGNOSIS — Z79.4 TYPE 2 DIABETES MELLITUS WITH OTHER SPECIFIED COMPLICATION, WITH LONG-TERM CURRENT USE OF INSULIN (MULTI): ICD-10-CM

## 2023-10-02 RX ORDER — METFORMIN HYDROCHLORIDE 1000 MG/1
TABLET ORAL
Qty: 180 TABLET | Refills: 0 | Status: SHIPPED | OUTPATIENT
Start: 2023-10-02 | End: 2023-12-21 | Stop reason: SDUPTHER

## 2023-10-10 DIAGNOSIS — I10 HYPERTENSION, UNSPECIFIED TYPE: ICD-10-CM

## 2023-10-10 DIAGNOSIS — I10 BENIGN ESSENTIAL HYPERTENSION: ICD-10-CM

## 2023-10-10 RX ORDER — VALSARTAN AND HYDROCHLOROTHIAZIDE 160; 12.5 MG/1; MG/1
1 TABLET, FILM COATED ORAL DAILY
Qty: 90 TABLET | Refills: 0 | Status: SHIPPED | OUTPATIENT
Start: 2023-10-10 | End: 2023-12-21 | Stop reason: SDUPTHER

## 2023-11-16 DIAGNOSIS — E11.42 TYPE 2 DIABETES MELLITUS WITH DIABETIC POLYNEUROPATHY, WITH LONG-TERM CURRENT USE OF INSULIN (MULTI): ICD-10-CM

## 2023-11-16 DIAGNOSIS — Z79.4 TYPE 2 DIABETES MELLITUS WITH DIABETIC POLYNEUROPATHY, WITH LONG-TERM CURRENT USE OF INSULIN (MULTI): ICD-10-CM

## 2023-11-16 RX ORDER — SYRINGE AND NEEDLE,INSULIN,1ML 28GX1/2"
SYRINGE, EMPTY DISPOSABLE MISCELLANEOUS
Qty: 400 EACH | Refills: 3 | Status: SHIPPED | OUTPATIENT
Start: 2023-11-16 | End: 2023-12-21 | Stop reason: SDUPTHER

## 2023-11-17 DIAGNOSIS — Z86.39 HISTORY OF UNCONTROLLED DIABETES: ICD-10-CM

## 2023-11-17 RX ORDER — INSULIN DETEMIR 100 [IU]/ML
50 INJECTION, SOLUTION SUBCUTANEOUS NIGHTLY
Qty: 15 ML | Refills: 0 | Status: SHIPPED | OUTPATIENT
Start: 2023-11-17 | End: 2023-12-21 | Stop reason: SDUPTHER

## 2023-12-11 ENCOUNTER — LAB (OUTPATIENT)
Dept: LAB | Facility: LAB | Age: 68
End: 2023-12-11
Payer: MEDICARE

## 2023-12-11 DIAGNOSIS — Z00.00 ANNUAL PHYSICAL EXAM: ICD-10-CM

## 2023-12-11 DIAGNOSIS — Z79.4 TYPE 2 DIABETES MELLITUS WITH DIABETIC POLYNEUROPATHY, WITH LONG-TERM CURRENT USE OF INSULIN (MULTI): ICD-10-CM

## 2023-12-11 DIAGNOSIS — Z12.5 SCREENING FOR PROSTATE CANCER: ICD-10-CM

## 2023-12-11 DIAGNOSIS — E55.9 VITAMIN D DEFICIENCY: ICD-10-CM

## 2023-12-11 DIAGNOSIS — E11.42 TYPE 2 DIABETES MELLITUS WITH DIABETIC POLYNEUROPATHY, WITH LONG-TERM CURRENT USE OF INSULIN (MULTI): ICD-10-CM

## 2023-12-11 DIAGNOSIS — F33.9 DEPRESSION, RECURRENT (CMS-HCC): ICD-10-CM

## 2023-12-11 LAB
25(OH)D3 SERPL-MCNC: 24 NG/ML (ref 30–100)
ALBUMIN SERPL BCP-MCNC: 4.4 G/DL (ref 3.4–5)
ALP SERPL-CCNC: 66 U/L (ref 33–136)
ALT SERPL W P-5'-P-CCNC: 21 U/L (ref 10–52)
ANION GAP SERPL CALC-SCNC: 13 MMOL/L (ref 10–20)
AST SERPL W P-5'-P-CCNC: 14 U/L (ref 9–39)
BASOPHILS # BLD AUTO: 0.04 X10*3/UL (ref 0–0.1)
BASOPHILS NFR BLD AUTO: 0.8 %
BILIRUB SERPL-MCNC: 0.9 MG/DL (ref 0–1.2)
BUN SERPL-MCNC: 24 MG/DL (ref 6–23)
CALCIUM SERPL-MCNC: 8.8 MG/DL (ref 8.6–10.3)
CHLORIDE SERPL-SCNC: 101 MMOL/L (ref 98–107)
CHOLEST SERPL-MCNC: 138 MG/DL (ref 0–199)
CHOLESTEROL/HDL RATIO: 2.5
CO2 SERPL-SCNC: 27 MMOL/L (ref 21–32)
CREAT SERPL-MCNC: 1.42 MG/DL (ref 0.5–1.3)
CREAT UR-MCNC: 224 MG/DL (ref 20–370)
EOSINOPHIL # BLD AUTO: 0.06 X10*3/UL (ref 0–0.7)
EOSINOPHIL NFR BLD AUTO: 1.2 %
ERYTHROCYTE [DISTWIDTH] IN BLOOD BY AUTOMATED COUNT: 14.4 % (ref 11.5–14.5)
EST. AVERAGE GLUCOSE BLD GHB EST-MCNC: 134 MG/DL
GFR SERPL CREATININE-BSD FRML MDRD: 54 ML/MIN/1.73M*2
GLUCOSE SERPL-MCNC: 179 MG/DL (ref 74–99)
HBA1C MFR BLD: 6.3 %
HCT VFR BLD AUTO: 44.4 % (ref 41–52)
HDLC SERPL-MCNC: 54.7 MG/DL
HGB BLD-MCNC: 15.1 G/DL (ref 13.5–17.5)
IMM GRANULOCYTES # BLD AUTO: 0.02 X10*3/UL (ref 0–0.7)
IMM GRANULOCYTES NFR BLD AUTO: 0.4 % (ref 0–0.9)
LDLC SERPL CALC-MCNC: 54 MG/DL
LYMPHOCYTES # BLD AUTO: 1.34 X10*3/UL (ref 1.2–4.8)
LYMPHOCYTES NFR BLD AUTO: 26.6 %
MCH RBC QN AUTO: 31.5 PG (ref 26–34)
MCHC RBC AUTO-ENTMCNC: 34 G/DL (ref 32–36)
MCV RBC AUTO: 93 FL (ref 80–100)
MICROALBUMIN UR-MCNC: 231.8 MG/L
MICROALBUMIN/CREAT UR: 103.5 UG/MG CREAT
MONOCYTES # BLD AUTO: 0.44 X10*3/UL (ref 0.1–1)
MONOCYTES NFR BLD AUTO: 8.7 %
NEUTROPHILS # BLD AUTO: 3.14 X10*3/UL (ref 1.2–7.7)
NEUTROPHILS NFR BLD AUTO: 62.3 %
NON HDL CHOLESTEROL: 83 MG/DL (ref 0–149)
NRBC BLD-RTO: 0 /100 WBCS (ref 0–0)
PLATELET # BLD AUTO: 328 X10*3/UL (ref 150–450)
POTASSIUM SERPL-SCNC: 4.5 MMOL/L (ref 3.5–5.3)
PROT SERPL-MCNC: 6.7 G/DL (ref 6.4–8.2)
PSA SERPL-MCNC: <0.1 NG/ML
RBC # BLD AUTO: 4.79 X10*6/UL (ref 4.5–5.9)
SODIUM SERPL-SCNC: 136 MMOL/L (ref 136–145)
TRIGL SERPL-MCNC: 147 MG/DL (ref 0–149)
TSH SERPL-ACNC: 2.01 MIU/L (ref 0.44–3.98)
VLDL: 29 MG/DL (ref 0–40)
WBC # BLD AUTO: 5 X10*3/UL (ref 4.4–11.3)

## 2023-12-11 PROCEDURE — 82043 UR ALBUMIN QUANTITATIVE: CPT

## 2023-12-11 PROCEDURE — 83036 HEMOGLOBIN GLYCOSYLATED A1C: CPT

## 2023-12-11 PROCEDURE — 36415 COLL VENOUS BLD VENIPUNCTURE: CPT

## 2023-12-11 PROCEDURE — G0103 PSA SCREENING: HCPCS

## 2023-12-11 PROCEDURE — 82306 VITAMIN D 25 HYDROXY: CPT

## 2023-12-11 PROCEDURE — 82570 ASSAY OF URINE CREATININE: CPT

## 2023-12-21 ENCOUNTER — OFFICE VISIT (OUTPATIENT)
Dept: PRIMARY CARE | Facility: CLINIC | Age: 68
End: 2023-12-21
Payer: MEDICARE

## 2023-12-21 VITALS
SYSTOLIC BLOOD PRESSURE: 128 MMHG | BODY MASS INDEX: 31.85 KG/M2 | WEIGHT: 222 LBS | OXYGEN SATURATION: 97 % | HEART RATE: 91 BPM | DIASTOLIC BLOOD PRESSURE: 88 MMHG

## 2023-12-21 DIAGNOSIS — E66.09 CLASS 1 OBESITY DUE TO EXCESS CALORIES WITH SERIOUS COMORBIDITY AND BODY MASS INDEX (BMI) OF 31.0 TO 31.9 IN ADULT: ICD-10-CM

## 2023-12-21 DIAGNOSIS — Z71.2 ENCOUNTER TO DISCUSS TEST RESULTS: Primary | ICD-10-CM

## 2023-12-21 DIAGNOSIS — C61 PROSTATE CANCER (MULTI): ICD-10-CM

## 2023-12-21 DIAGNOSIS — E55.9 VITAMIN D DEFICIENCY: ICD-10-CM

## 2023-12-21 DIAGNOSIS — I73.9 PVD (PERIPHERAL VASCULAR DISEASE) WITH CLAUDICATION (CMS-HCC): ICD-10-CM

## 2023-12-21 DIAGNOSIS — E78.00 HYPERCHOLESTEROLEMIA: ICD-10-CM

## 2023-12-21 DIAGNOSIS — E11.69 TYPE 2 DIABETES MELLITUS WITH OTHER SPECIFIED COMPLICATION, WITH LONG-TERM CURRENT USE OF INSULIN (MULTI): ICD-10-CM

## 2023-12-21 DIAGNOSIS — F12.90 MARIJUANA SMOKER: ICD-10-CM

## 2023-12-21 DIAGNOSIS — I10 BENIGN ESSENTIAL HYPERTENSION: ICD-10-CM

## 2023-12-21 DIAGNOSIS — Z79.4 TYPE 2 DIABETES MELLITUS WITH OTHER SPECIFIED COMPLICATION, WITH LONG-TERM CURRENT USE OF INSULIN (MULTI): ICD-10-CM

## 2023-12-21 PROCEDURE — 1036F TOBACCO NON-USER: CPT | Performed by: INTERNAL MEDICINE

## 2023-12-21 PROCEDURE — G0446 INTENS BEHAVE THER CARDIO DX: HCPCS | Performed by: INTERNAL MEDICINE

## 2023-12-21 PROCEDURE — 3060F POS MICROALBUMINURIA REV: CPT | Performed by: INTERNAL MEDICINE

## 2023-12-21 PROCEDURE — 3048F LDL-C <100 MG/DL: CPT | Performed by: INTERNAL MEDICINE

## 2023-12-21 PROCEDURE — 3079F DIAST BP 80-89 MM HG: CPT | Performed by: INTERNAL MEDICINE

## 2023-12-21 PROCEDURE — 99214 OFFICE O/P EST MOD 30 MIN: CPT | Performed by: INTERNAL MEDICINE

## 2023-12-21 PROCEDURE — 3008F BODY MASS INDEX DOCD: CPT | Performed by: INTERNAL MEDICINE

## 2023-12-21 PROCEDURE — 3044F HG A1C LEVEL LT 7.0%: CPT | Performed by: INTERNAL MEDICINE

## 2023-12-21 PROCEDURE — 1160F RVW MEDS BY RX/DR IN RCRD: CPT | Performed by: INTERNAL MEDICINE

## 2023-12-21 PROCEDURE — 1159F MED LIST DOCD IN RCRD: CPT | Performed by: INTERNAL MEDICINE

## 2023-12-21 PROCEDURE — 3074F SYST BP LT 130 MM HG: CPT | Performed by: INTERNAL MEDICINE

## 2023-12-21 RX ORDER — CHOLECALCIFEROL (VITAMIN D3) 50 MCG
50 TABLET ORAL DAILY
Qty: 30 TABLET | Refills: 2 | Status: SHIPPED | OUTPATIENT
Start: 2023-12-21 | End: 2024-01-15

## 2023-12-21 RX ORDER — SYRINGE-NEEDLE,INSULIN,0.5 ML 28 GAUGE
1 SYRINGE, EMPTY DISPOSABLE MISCELLANEOUS NIGHTLY
COMMUNITY
End: 2023-12-21 | Stop reason: SDUPTHER

## 2023-12-21 RX ORDER — METFORMIN HYDROCHLORIDE 1000 MG/1
1000 TABLET ORAL
Qty: 180 TABLET | Refills: 0 | Status: SHIPPED | OUTPATIENT
Start: 2023-12-21 | End: 2024-03-07 | Stop reason: SDUPTHER

## 2023-12-21 RX ORDER — ATORVASTATIN CALCIUM 20 MG/1
20 TABLET, FILM COATED ORAL NIGHTLY
Qty: 90 TABLET | Refills: 0 | Status: SHIPPED | OUTPATIENT
Start: 2023-12-21 | End: 2024-03-07 | Stop reason: SDUPTHER

## 2023-12-21 RX ORDER — BLOOD SUGAR DIAGNOSTIC
STRIP MISCELLANEOUS
Qty: 100 STRIP | Refills: 11 | Status: SHIPPED | OUTPATIENT
Start: 2023-12-21

## 2023-12-21 RX ORDER — SYRINGE AND NEEDLE,INSULIN,1ML 28GX1/2"
SYRINGE, EMPTY DISPOSABLE MISCELLANEOUS
Qty: 400 EACH | Refills: 3 | Status: SHIPPED | OUTPATIENT
Start: 2023-12-21

## 2023-12-21 RX ORDER — VALSARTAN AND HYDROCHLOROTHIAZIDE 160; 12.5 MG/1; MG/1
1 TABLET, FILM COATED ORAL DAILY
Qty: 90 TABLET | Refills: 0 | Status: SHIPPED | OUTPATIENT
Start: 2023-12-21 | End: 2024-03-07 | Stop reason: SDUPTHER

## 2023-12-21 RX ORDER — INSULIN DETEMIR 100 [IU]/ML
50 INJECTION, SOLUTION SUBCUTANEOUS NIGHTLY
Qty: 15 ML | Refills: 2 | Status: SHIPPED | OUTPATIENT
Start: 2023-12-21 | End: 2024-03-07 | Stop reason: SDUPTHER

## 2023-12-21 RX ORDER — SYRINGE-NEEDLE,INSULIN,0.5 ML 28 GAUGE
1 SYRINGE, EMPTY DISPOSABLE MISCELLANEOUS NIGHTLY
Qty: 100 EACH | Refills: 3 | Status: SHIPPED | OUTPATIENT
Start: 2023-12-21

## 2023-12-21 RX ORDER — INSULIN LISPRO 100 [IU]/ML
INJECTION, SOLUTION INTRAVENOUS; SUBCUTANEOUS
Qty: 10 ML | Refills: 2 | Status: SHIPPED | OUTPATIENT
Start: 2023-12-21

## 2023-12-21 NOTE — PROGRESS NOTES
Subjective   Patient ID: Tyshawn Wilkerson is a 68 y.o. male who presents for 3 month med management and Cold Feet.  HPI    Routine follow up    Labs rev'd    Cold feet x chronic  Claudication sx    back pain predominantly right-sided with right-sided hip pain better  Fed up with pharmacy  stopped med     DM FBS not checked  this a.m. on insulin better no side effects  Follow up optho  HBA1C 6.3  12-23  nephropathy     Diarrhea using pepto bismol  H/o colon polyps  Colonoscopy not scheduled yet     Hypercholesterolemia on therapy no side effects     Hypertension stable on therapy no side effects     Prostate cancer  Didn't see urology     Smokes marijuana drinks beer  smoking and alcohol  cessation discussed     Diet and exercise reviewed    Review of Systems   All other systems reviewed and are negative.      Objective   /88   Pulse 91   Wt 101 kg (222 lb)   SpO2 97%   BMI 31.85 kg/m²   Lab Results   Component Value Date    WBC 5.0 12/11/2023    HGB 15.1 12/11/2023    HCT 44.4 12/11/2023     12/11/2023    CHOL 138 12/11/2023    TRIG 147 12/11/2023    HDL 54.7 12/11/2023    ALT 21 12/11/2023    AST 14 12/11/2023     12/11/2023    K 4.5 12/11/2023     12/11/2023    CREATININE 1.42 (H) 12/11/2023    BUN 24 (H) 12/11/2023    CO2 27 12/11/2023    TSH 2.01 12/11/2023    PSA <0.10 12/11/2023    INR 0.9 08/11/2020    HGBA1C 6.3 (H) 12/11/2023           Physical Exam  Vitals reviewed.   Constitutional:       General: He is not in acute distress.     Appearance: Normal appearance. He is well-developed. He is obese. He is not diaphoretic.   HENT:      Head: Normocephalic and atraumatic.      Nose: Nose normal.      Mouth/Throat:      Pharynx: No posterior oropharyngeal erythema.   Eyes:      General: No scleral icterus.     Conjunctiva/sclera: Conjunctivae normal.      Pupils: Pupils are equal, round, and reactive to light.   Neck:      Thyroid: No thyromegaly.      Vascular: No JVD.    Cardiovascular:      Rate and Rhythm: Normal rate and regular rhythm.      Heart sounds: Normal heart sounds. No murmur heard.     No friction rub. No gallop.      Comments: Bare feet exam  B/l feet cold   DP!+/=  Pulmonary:      Effort: Pulmonary effort is normal. No respiratory distress.      Breath sounds: Normal breath sounds. No wheezing or rales.   Chest:      Chest wall: No tenderness.   Abdominal:      General: Abdomen is flat. Bowel sounds are normal. There is no distension.      Palpations: Abdomen is soft. There is no mass.      Tenderness: There is no abdominal tenderness. There is no rebound.   Musculoskeletal:         General: Normal range of motion.      Cervical back: Normal range of motion and neck supple.   Lymphadenopathy:      Cervical: No cervical adenopathy.   Skin:     General: Skin is warm and dry.   Neurological:      General: No focal deficit present.      Mental Status: He is alert and oriented to person, place, and time. Mental status is at baseline.      Deep Tendon Reflexes: Reflexes normal.   Psychiatric:         Mood and Affect: Mood normal.         Behavior: Behavior normal.         Thought Content: Thought content normal.         Judgment: Judgment normal.         Problem List Items Addressed This Visit             ICD-10-CM       Endocrine/Metabolic    Vitamin D deficiency E55.9    Relevant Medications    cholecalciferol (Vitamin D-3) 50 MCG (2000 UT) tablet       Hematology and Neoplasia    Prostate cancer (CMS/[facility]) C61     Other Visit Diagnoses         Codes    Encounter to discuss test results    -  Primary Z71.2    Type 2 diabetes mellitus with other specified complication, with long-term current use of insulin (CMS/HCC)     E11.69, Z79.4    Relevant Medications    metFORMIN (Glucophage) 1,000 mg tablet    Levemir U-100 Insulin 100 unit/mL injection    insulin syringe-needle U-100 1 mL 28 gauge syringe    HumaLOG U-100 Insulin 100 unit/mL injection    BD Insulin Syringe, half  "unit, 0.3 mL 31 gauge x 5/16\" syringe    Accu-Chek Rosie Plus test strp strip    PVD (peripheral vascular disease) with claudication (CMS/HCC)     I73.9    Relevant Orders    Vascular US lower extremity arterial duplex bilateral    Benign essential hypertension     I10    Relevant Medications    valsartan-hydrochlorothiazide (Diovan-HCT) 160-12.5 mg tablet    Hypercholesterolemia     E78.00    Relevant Medications    atorvastatin (Lipitor) 20 mg tablet    Marijuana smoker     F12.90    Class 1 obesity due to excess calories with serious comorbidity and body mass index (BMI) of 31.0 to 31.9 in adult     E66.09, Z68.31          Assessment/Plan     Labs rev'd    Cold feet x chronic  Claudication sx    back pain predominantly right-sided with right-sided hip pain better  Fed up with pharmacy  stopped med     DM FBS not checked  this a.m. on insulin better no side effects  Follow up optho  HBA1C 6.3  12-23  nephropathy     Diarrhea using pepto bismol  H/o colon polyps  Colonoscopy not scheduled yet     Hypercholesterolemia on therapy no side effects  I spent 15 minutes face-to-face with this individual discussing their cardiovascular risk and behavioral therapies of nutritional choices, exercise, and elimination of habits contributing to risk. We agreed on plan how they may be able to reduce their current cardiovascular risk.  Patient 10 year cardiac risk estimate calculates :   21.7    %      Hypertension stable on therapy no side effects     Prostate cancer  Didn't see urology     Smokes marijuana drinks beer  smoking and alcohol  cessation discussed     Diet and exercise reviewed    Prostate 9-23  Colonoscopy pending  CT chest lung cancer screening n/a  immunizations rev'd  BMI 31.8    Follow up 3 months  "

## 2024-01-02 ENCOUNTER — OFFICE VISIT (OUTPATIENT)
Dept: GASTROENTEROLOGY | Facility: CLINIC | Age: 69
End: 2024-01-02
Payer: MEDICARE

## 2024-01-02 VITALS — BODY MASS INDEX: 31.78 KG/M2 | HEIGHT: 70 IN | WEIGHT: 222 LBS

## 2024-01-02 DIAGNOSIS — Z12.11 COLON CANCER SCREENING: ICD-10-CM

## 2024-01-02 DIAGNOSIS — R10.13 EPIGASTRIC PAIN: Primary | ICD-10-CM

## 2024-01-02 PROCEDURE — 99204 OFFICE O/P NEW MOD 45 MIN: CPT | Performed by: NURSE PRACTITIONER

## 2024-01-02 PROCEDURE — 1036F TOBACCO NON-USER: CPT | Performed by: NURSE PRACTITIONER

## 2024-01-02 PROCEDURE — 1159F MED LIST DOCD IN RCRD: CPT | Performed by: NURSE PRACTITIONER

## 2024-01-02 PROCEDURE — 3008F BODY MASS INDEX DOCD: CPT | Performed by: NURSE PRACTITIONER

## 2024-01-02 NOTE — PATIENT INSTRUCTIONS
Schedule EGD and Colonoscopy  Avoid Alcohol  Follow GERD dietary and lifestyle recommendations, see handouts  Use tums as needed, will hold off on starting Omeprazole for now   Good control of blood sugars   Start Fiber supplement daily

## 2024-01-02 NOTE — PROGRESS NOTES
Subjective   Patient ID: Tyshawn Wilkerson is a 68 y.o. male who presents for Abdominal Pain.    68 year old male with hx of DM who comes in for evaluation of abdominal pain. Has been ongoing for about a year. Has epigastric pain and lower abdominal cramping. Also notes chronic hip and back pain. Tells me the pain comes and goes. Epigastric pain is worse after eating big meals or italian food. Often lays down after eating. Drinks 6-8 cups of coffee per day. Tells me his diet is not great, fried foods and fast food. Does takes tums which helps. Has been on PPI in the past but ot currently. Was drinking liquor frequently in early 2023 but has cut back a lot. No NSAIDs. Uses Marijuana. No cigarettes. Colonoscopy in 2018 with 1 polyp. Grandfather with CRC.     @Abdominal pain-Yes  Bloating-Yes  Abdominal swelling-No  Burping-Yes  Trouble swallowing-No  Painful swallowing-No  Feeling full after small meal-Yes  Heartburn-Yes   Nausea-No  Regurgitation-No  Vomiting-No  Vomiting blood-N/A  Vomiting coffee grounds-N/A    Constipation-No  Diarrhea-No  Fecal incontinence-N/A  Fecal urgency-N/A  BRBPR-No  Black stools-No  Maroon stools-No   Unintentional weight loss-No   Have you had a Colonoscopy-Yes 7/2/18  Have you had an EGD-No     Objective   Physical Exam  @Constitutional: well nourished, well appearing. NAD. Alert and cooperative  Skin: no jaundice   Eyes: anicteric, normal conjunctiva  ENT: MMM  Pulmonary: easy and nonlabored on RA  Abdomen: soft, NT, ND. No ascites.  MSK: MAEx4  Extremities: no edema  Neuro: aaox3  Psych: appropriate mood and behavior     Lab on 12/11/2023   Component Date Value Ref Range Status    Hemoglobin A1C 12/11/2023 6.3 (H)  see below % Final    Estimated Average Glucose 12/11/2023 134  Not Established mg/dL Final    WBC 12/11/2023 5.0  4.4 - 11.3 x10*3/uL Final    nRBC 12/11/2023 0.0  0.0 - 0.0 /100 WBCs Final    RBC 12/11/2023 4.79  4.50 - 5.90 x10*6/uL Final    Hemoglobin 12/11/2023 15.1   13.5 - 17.5 g/dL Final    Hematocrit 12/11/2023 44.4  41.0 - 52.0 % Final    MCV 12/11/2023 93  80 - 100 fL Final    MCH 12/11/2023 31.5  26.0 - 34.0 pg Final    MCHC 12/11/2023 34.0  32.0 - 36.0 g/dL Final    RDW 12/11/2023 14.4  11.5 - 14.5 % Final    Platelets 12/11/2023 328  150 - 450 x10*3/uL Final    Neutrophils % 12/11/2023 62.3  40.0 - 80.0 % Final    Immature Granulocytes %, Automated 12/11/2023 0.4  0.0 - 0.9 % Final    Immature Granulocyte Count (IG) includes promyelocytes, myelocytes and metamyelocytes but does not include bands. Percent differential counts (%) should be interpreted in the context of the absolute cell counts (cells/UL).    Lymphocytes % 12/11/2023 26.6  13.0 - 44.0 % Final    Monocytes % 12/11/2023 8.7  2.0 - 10.0 % Final    Eosinophils % 12/11/2023 1.2  0.0 - 6.0 % Final    Basophils % 12/11/2023 0.8  0.0 - 2.0 % Final    Neutrophils Absolute 12/11/2023 3.14  1.20 - 7.70 x10*3/uL Final    Percent differential counts (%) should be interpreted in the context of the absolute cell counts (cells/uL).    Immature Granulocytes Absolute, Au* 12/11/2023 0.02  0.00 - 0.70 x10*3/uL Final    Lymphocytes Absolute 12/11/2023 1.34  1.20 - 4.80 x10*3/uL Final    Monocytes Absolute 12/11/2023 0.44  0.10 - 1.00 x10*3/uL Final    Eosinophils Absolute 12/11/2023 0.06  0.00 - 0.70 x10*3/uL Final    Basophils Absolute 12/11/2023 0.04  0.00 - 0.10 x10*3/uL Final    Glucose 12/11/2023 179 (H)  74 - 99 mg/dL Final    Sodium 12/11/2023 136  136 - 145 mmol/L Final    Potassium 12/11/2023 4.5  3.5 - 5.3 mmol/L Final    Chloride 12/11/2023 101  98 - 107 mmol/L Final    Bicarbonate 12/11/2023 27  21 - 32 mmol/L Final    Anion Gap 12/11/2023 13  10 - 20 mmol/L Final    Urea Nitrogen 12/11/2023 24 (H)  6 - 23 mg/dL Final    Creatinine 12/11/2023 1.42 (H)  0.50 - 1.30 mg/dL Final    eGFR 12/11/2023 54 (L)  >60 mL/min/1.73m*2 Final    Calculations of estimated GFR are performed using the 2021 CKD-EPI Study Refit  equation without the race variable for the IDMS-Traceable creatinine methods.  https://jasn.asnjournals.org/content/early/2021/09/22/ASN.6739595507    Calcium 12/11/2023 8.8  8.6 - 10.3 mg/dL Final    Albumin 12/11/2023 4.4  3.4 - 5.0 g/dL Final    Alkaline Phosphatase 12/11/2023 66  33 - 136 U/L Final    Total Protein 12/11/2023 6.7  6.4 - 8.2 g/dL Final    AST 12/11/2023 14  9 - 39 U/L Final    Bilirubin, Total 12/11/2023 0.9  0.0 - 1.2 mg/dL Final    ALT 12/11/2023 21  10 - 52 U/L Final    Patients treated with Sulfasalazine may generate falsely decreased results for ALT.    Cholesterol 12/11/2023 138  0 - 199 mg/dL Final          Age      Desirable   Borderline High   High     0-19 Y     0 - 169       170 - 199     >/= 200    20-24 Y     0 - 189       190 - 224     >/= 225         >24 Y     0 - 199       200 - 239     >/= 240   **All ranges are based on fasting samples. Specific   therapeutic targets will vary based on patient-specific   cardiac risk.    Pediatric guidelines reference:Pediatrics 2011, 128(S5).Adult guidelines reference: NCEP ATPIII Guidelines,GABRIELE 2001, 258:2486-97    Venipuncture immediately after or during the administration of Metamizole may lead to falsely low results. Testing should be performed immediately prior to Metamizole dosing.    HDL-Cholesterol 12/11/2023 54.7  mg/dL Final      Age       Very Low   Low     Normal    High    0-19 Y    < 35      < 40     40-45     ----  20-24 Y    ----     < 40      >45      ----        >24 Y      ----     < 40     40-60      >60      Cholesterol/HDL Ratio 12/11/2023 2.5   Final      Ref Values  Desirable  < 3.4  High Risk  > 5.0    LDL Calculated 12/11/2023 54  <=99 mg/dL Final                                Near   Borderline      AGE      Desirable  Optimal    High     High     Very High     0-19 Y     0 - 109     ---    110-129   >/= 130     ----    20-24 Y     0 - 119     ---    120-159   >/= 160     ----      >24 Y     0 -  99   100-129  130-159    160-189     >/=190      VLDL 12/11/2023 29  0 - 40 mg/dL Final    Triglycerides 12/11/2023 147  0 - 149 mg/dL Final       Age         Desirable   Borderline High   High     Very High   0 D-90 D    19 - 174         ----         ----        ----  91 D- 9 Y     0 -  74        75 -  99     >/= 100      ----    10-19 Y     0 -  89        90 - 129     >/= 130      ----    20-24 Y     0 - 114       115 - 149     >/= 150      ----         >24 Y     0 - 149       150 - 199    200- 499    >/= 500    Venipuncture immediately after or during the administration of Metamizole may lead to falsely low results. Testing should be performed immediately prior to Metamizole dosing.    Non HDL Cholesterol 12/11/2023 83  0 - 149 mg/dL Final          Age       Desirable   Borderline High   High     Very High     0-19 Y     0 - 119       120 - 144     >/= 145    >/= 160    20-24 Y     0 - 149       150 - 189     >/= 190      ----         >24 Y    30 mg/dL above LDL Cholesterol goal      Thyroid Stimulating Hormone 12/11/2023 2.01  0.44 - 3.98 mIU/L Final    Vitamin D, 25-Hydroxy, Total 12/11/2023 24 (L)  30 - 100 ng/mL Final    Prostate Specific AG 12/11/2023 <0.10  <=4.00 ng/mL Final    Albumin, Urine Random 12/11/2023 231.8  Not established mg/L Final    Creatinine, Urine Random 12/11/2023 224.0  20.0 - 370.0 mg/dL Final    Albumin/Creatine Ratio 12/11/2023 103.5 (H)  <30.0 ug/mg Creat Final       Assessment/Plan   68 year old male here with complaints of chronic abdominal pain, epigastric and lower abdominal cramping. Has alternating bowel habits. Suspect diet is large contributor. Will plan EGD to r/o esophagitis, large HH, H Pylori. Also due for screening Colonoscopy.     Schedule EGD and Colonoscopy  Avoid Alcohol  Follow GERD dietary and lifestyle recommendations, see handouts  Use tums as needed, will hold off on starting Omeprazole for now as would like to make dietary changes first  Good control of blood sugars   Start Fiber  supplement daily     Consider GES if ongoing   Follow up pending above workup and symptoms

## 2024-01-08 ENCOUNTER — HOSPITAL ENCOUNTER (OUTPATIENT)
Dept: RADIOLOGY | Facility: HOSPITAL | Age: 69
Discharge: HOME | End: 2024-01-08
Payer: MEDICARE

## 2024-01-08 DIAGNOSIS — I73.9 PVD (PERIPHERAL VASCULAR DISEASE) WITH CLAUDICATION (CMS-HCC): ICD-10-CM

## 2024-01-08 PROCEDURE — 93922 UPR/L XTREMITY ART 2 LEVELS: CPT

## 2024-01-08 PROCEDURE — 93922 UPR/L XTREMITY ART 2 LEVELS: CPT | Performed by: STUDENT IN AN ORGANIZED HEALTH CARE EDUCATION/TRAINING PROGRAM

## 2024-01-14 DIAGNOSIS — E55.9 VITAMIN D DEFICIENCY: ICD-10-CM

## 2024-01-15 RX ORDER — OMEGA-3S/DHA/EPA/FISH OIL/D3 300MG-1000
2000 CAPSULE ORAL DAILY
Qty: 90 TABLET | Refills: 0 | Status: SHIPPED | OUTPATIENT
Start: 2024-01-15 | End: 2024-03-07 | Stop reason: SDUPTHER

## 2024-02-07 ENCOUNTER — HOSPITAL ENCOUNTER (EMERGENCY)
Facility: HOSPITAL | Age: 69
Discharge: HOME | End: 2024-02-08
Attending: STUDENT IN AN ORGANIZED HEALTH CARE EDUCATION/TRAINING PROGRAM
Payer: COMMERCIAL

## 2024-02-07 DIAGNOSIS — E16.2 HYPOGLYCEMIA: Primary | ICD-10-CM

## 2024-02-07 LAB
GLUCOSE BLD MANUAL STRIP-MCNC: 100 MG/DL (ref 74–99)
GLUCOSE BLD MANUAL STRIP-MCNC: 118 MG/DL (ref 74–99)
GLUCOSE BLD MANUAL STRIP-MCNC: 139 MG/DL (ref 74–99)
GLUCOSE BLD MANUAL STRIP-MCNC: 141 MG/DL (ref 74–99)
GLUCOSE BLD MANUAL STRIP-MCNC: 62 MG/DL (ref 74–99)

## 2024-02-07 PROCEDURE — 82947 ASSAY GLUCOSE BLOOD QUANT: CPT

## 2024-02-07 PROCEDURE — 99283 EMERGENCY DEPT VISIT LOW MDM: CPT

## 2024-02-07 PROCEDURE — 99281 EMR DPT VST MAYX REQ PHY/QHP: CPT | Performed by: STUDENT IN AN ORGANIZED HEALTH CARE EDUCATION/TRAINING PROGRAM

## 2024-02-07 ASSESSMENT — LIFESTYLE VARIABLES
EVER HAD A DRINK FIRST THING IN THE MORNING TO STEADY YOUR NERVES TO GET RID OF A HANGOVER: NO
HAVE PEOPLE ANNOYED YOU BY CRITICIZING YOUR DRINKING: NO
EVER FELT BAD OR GUILTY ABOUT YOUR DRINKING: NO
HAVE YOU EVER FELT YOU SHOULD CUT DOWN ON YOUR DRINKING: NO

## 2024-02-07 ASSESSMENT — PAIN - FUNCTIONAL ASSESSMENT: PAIN_FUNCTIONAL_ASSESSMENT: 0-10

## 2024-02-07 ASSESSMENT — COLUMBIA-SUICIDE SEVERITY RATING SCALE - C-SSRS
6. HAVE YOU EVER DONE ANYTHING, STARTED TO DO ANYTHING, OR PREPARED TO DO ANYTHING TO END YOUR LIFE?: NO
2. HAVE YOU ACTUALLY HAD ANY THOUGHTS OF KILLING YOURSELF?: NO
1. IN THE PAST MONTH, HAVE YOU WISHED YOU WERE DEAD OR WISHED YOU COULD GO TO SLEEP AND NOT WAKE UP?: NO

## 2024-02-07 ASSESSMENT — PAIN SCALES - GENERAL: PAINLEVEL_OUTOF10: 0 - NO PAIN

## 2024-02-08 VITALS
DIASTOLIC BLOOD PRESSURE: 88 MMHG | TEMPERATURE: 97.2 F | BODY MASS INDEX: 31.48 KG/M2 | HEART RATE: 92 BPM | SYSTOLIC BLOOD PRESSURE: 122 MMHG | HEIGHT: 71 IN | OXYGEN SATURATION: 98 % | WEIGHT: 224.87 LBS | RESPIRATION RATE: 16 BRPM

## 2024-02-08 LAB — GLUCOSE BLD MANUAL STRIP-MCNC: 164 MG/DL (ref 74–99)

## 2024-02-08 PROCEDURE — 82947 ASSAY GLUCOSE BLOOD QUANT: CPT

## 2024-02-08 NOTE — ED TRIAGE NOTES
Pt took 50 units of Novalog instead of his long acting insulin at 1920. Pt eat several candy bars and ice cream. Blood glucose in triage is 118.

## 2024-02-08 NOTE — ED PROVIDER NOTES
History/Exam limitations: none  HPI was provided by patient    HPI:    Chief Complaint   Patient presents with    pt took the wrong insulin     Pt took 50 units of Novalog instead of his long acting insulin at 1920. Pt eat several candy bars and ice cream. Blood glucose in triage is 118.        Tyshawn Wilkerson is a 68 y.o. male presents with chief complaint of concern for hypoglycemia.  Patient states he took 50 units of NovoLog instead of his long-acting which occurred at 720 prior to arrival.  Then he states he ate tons of candy bars and ice cream and food prior to arrival.  Upon arrival here his glucose in triage was 118.  States presently just feels slightly anxious as the situation.  States he did accidentally.  He states it happened 1 time before in the past and had to come the hospital for it.  States he woke up out of his sleep and was not thinking when he dosed himself.  Has history of type 2 diabetes.        ROS: All other review of systems are negative except as noted above and HPI or ROS.   CONSTITUTIONAL:       fever, chills  EYES:      Blurry vision, change in vision  ENT:       sore throat, congestion, rhinorrhea  CARDIOVASCULAR:       chest pain, palpitations, swelling  RESPIRATORY:       cough, wheeze, shortness of breath  GI:       nausea, vomiting, diarrhea, abdominal pain  GENITOURINARY:       dysuria, hematuria, frequency  MUSCULOSKELETAL:       deformity, neck pain  SKIN:       rash, lesion  NEUROLOGIC:       headache, numbness, focal weakness  ENDOCRINE:      weight loss, fatigue  NOTES: All systems reviewed, negative except as described above     Physical Exam:  GENERAL: Alert, oriented , cooperative,  in no acute distress.  HEAD: normocephalic, atraumatic  SKIN:  dry skin, no lesions.  EYES: PERRL, EOMs intact,  Conjunctiva pink with no erythema or exudates. No scleral icterus.   ENT: No external deformities. Nares patent, mucus membranes moist.  Pharynx clear, uvula midline.   NECK:  Supple, without meningismus. Trachea at midline. No lymphadenopathy.  PULMONARY: Clear bilaterally. No crackles, rhonchi, wheezing.  No respiratory distress.  No work of breathing.  CARDIAC: Regular rate and regular rhythm.  Pulses 2+ in radials and dorsal pedal pulses bilaterally.  No murmur, rub, gallop.  No edema.  ABDOMEN: Soft, nontender, active bowel sounds.  No palpable organomegaly.  No rebound or guarding.  No CVA tenderness.  No pulsatile masses.  : Exam deferred.  MUSCULOSKELETAL: Full range of motion throughout, no deformity.   NEUROLOGICAL:  CN II through XII are grossly intact, no focal neuro deficits.  Strength 5 out of 5 throughout bilateral upper and lower extremities neurovascular intact in bilateral upper and lower extremities  PSYCHIATRIC: Appropriate mood and affect. Calm.       MDM/ED COURSE:    The patient presented for evaluation of hyperglycemia  Patient observed here for multiple hours and multiple point-of-care glucose was performed and given to periodically as needed concerning p.o. well until glucose has stabilized.    Patient feels safe for discharge home.  Patient nontoxic-appearing on reexamination.  Vital signs are stable.   The patient and caregiver are in agreement with the plan and given instructions to follow up with their PCP.       I discussed the differential, results and plan with the patient and/or family/friend/caregiver if present. All questions answered.     I emphasized the importance of follow-up with the physician I referred them to in the timeframe recommended.  I explained reasons for the patient to return to the Emergency Department. Additional verbal discharge instructions were also given and discussed with the patient to supplement those generated by the EMR. We also discussed medications that were prescribed (if any) including common side effects and interactions. The patient was advised to abstain from driving, operating heavy machinery or making significant  decisions while taking medications such as opiates and muscle relaxers that may impair this. All questions were addressed.  They understand return precautions and discharge instructions. The patient and/or family/friend/caregiver expressed understanding.                    ED Course as of 02/08/24 0344   Wed Feb 07, 2024   2315 Glucose here did drop to 62, we will give patient a full meal and continue to observe. [WL]   u Feb 08, 2024   0032 Blood pressure trending up here.  Patient does not want to stay any longer.  Advised him close follow-up with his primary care doc and he voices understanding. [WL]      ED Course User Index  [WL] Duane Garland,          Diagnoses as of 02/08/24 0344   Hypoglycemia         Past Medical History:   Diagnosis Date    Other conditions influencing health status     Screening for malignant neoplasms, colon    Pain in left shoulder     Pain in joint of left shoulder    Personal history of diseases of the skin and subcutaneous tissue     History of abscess of skin and subcutaneous tissue    Personal history of other specified conditions     History of diarrhea      Social History     Socioeconomic History    Marital status:      Spouse name: Not on file    Number of children: Not on file    Years of education: Not on file    Highest education level: Not on file   Occupational History    Not on file   Tobacco Use    Smoking status: Former     Types: Cigarettes     Passive exposure: Never    Smokeless tobacco: Never   Vaping Use    Vaping Use: Never used   Substance and Sexual Activity    Alcohol use: Yes     Comment: occasionally    Drug use: Yes     Types: Marijuana     Comment: sometimes    Sexual activity: Not on file   Other Topics Concern    Not on file   Social History Narrative    Not on file     Social Determinants of Health     Financial Resource Strain: Not on file   Food Insecurity: Not on file   Transportation Needs: Not on file   Physical Activity: Not on file  "  Stress: Not on file   Social Connections: Not on file   Intimate Partner Violence: Not on file   Housing Stability: Not on file     Current Outpatient Medications   Medication Instructions    Accu-Chek Rosie Plus test strp strip use 1 TEST STRIP to TEST BLOOD SUGAR four times a day    atorvastatin (LIPITOR) 20 mg, oral, Nightly    BD Insulin Syringe, half unit, 0.3 mL 31 gauge x 5/16\" syringe inject 30 units OF LEVEMIR daily and 6 to 12 units OF HUMALOG upto 3 times  daily    HumaLOG U-100 Insulin 100 unit/mL injection Inject under the skin 3 times a day with meals. Inject 10-20 units per sliding scale three times daily.    insulin syringe-needle U-100 1 mL 28 gauge syringe 1 Syringe, subcutaneous, Nightly, Inject 50 units subcutaneous once daily at bedtime.  Take as directed per insulin instructions.<BR>    Levemir U-100 Insulin 50 Units, subcutaneous, Nightly, Take as directed per insulin instructions.    metFORMIN (GLUCOPHAGE) 1,000 mg, oral, 2 times daily with meals    valsartan-hydrochlorothiazide (Diovan-HCT) 160-12.5 mg tablet 1 tablet, oral, Daily    Vitamin D3 2,000 Units, oral, Daily     No Known Allergies          ED Triage Vitals [02/07/24 2045]   Temperature Heart Rate Respirations BP   36.2 °C (97.2 °F) 92 16 161/86      Pulse Ox Temp Source Heart Rate Source Patient Position   99 % Temporal -- --      BP Location FiO2 (%)     -- --               Labs and Imaging  No orders to display     Labs Reviewed   POCT GLUCOSE - Abnormal       Result Value    POCT Glucose 118 (*)    POCT GLUCOSE - Abnormal    POCT Glucose 141 (*)    POCT GLUCOSE - Abnormal    POCT Glucose 139 (*)    POCT GLUCOSE - Abnormal    POCT Glucose 62 (*)    POCT GLUCOSE - Abnormal    POCT Glucose 100 (*)    POCT GLUCOSE - Abnormal    POCT Glucose 164 (*)            Procedure  Procedures                  Duane Garland DO  02/08/24 0344    "

## 2024-03-01 ENCOUNTER — TELEPHONE (OUTPATIENT)
Dept: PREADMISSION TESTING | Facility: HOSPITAL | Age: 69
End: 2024-03-01
Payer: MEDICARE

## 2024-03-01 PROBLEM — I73.9 PERIPHERAL VASCULAR DISEASE (CMS-HCC): Status: ACTIVE | Noted: 2024-03-01

## 2024-03-01 PROBLEM — E66.9 OBESITY DUE TO ENERGY IMBALANCE: Status: ACTIVE | Noted: 2024-03-01

## 2024-03-01 PROBLEM — U07.1 DISEASE DUE TO SEVERE ACUTE RESPIRATORY SYNDROME CORONAVIRUS 2 (SARS-COV-2): Status: ACTIVE | Noted: 2024-03-01

## 2024-03-01 PROBLEM — R10.13 EPIGASTRIC PAIN: Status: ACTIVE | Noted: 2024-03-01

## 2024-03-01 PROBLEM — E66.9 OBESITY: Status: ACTIVE | Noted: 2024-03-01

## 2024-03-01 RX ORDER — SYRINGE-NEEDLE,INSULIN,0.5 ML 27GX1/2"
SYRINGE, EMPTY DISPOSABLE MISCELLANEOUS
COMMUNITY
Start: 2023-12-22

## 2024-03-01 NOTE — TELEPHONE ENCOUNTER
SURGERY PRE-OPERATIVE INSTRUCTIONS    *You will receive a phone call the day before your procedure  after 2pm, (or the Friday before your surgery if scheduled on a Monday.) Generally the hospital will be calling you with this information after that time.    *You are not to eat after midnight the night before the surgery. You may have 8oz of a clear liquid up until 2 hours prior to arriving to the hospital. The exception is with medications you were instructed to take day of surgery.    *You may take tylenol for pain/discomfort as needed.     *Stop taking all aspirin products, ibuprofen (motrin/advil), naproxen (aleve/naprosyn) for one week prior to surgery.    *Stop taking all vitamins and supplements one week prior to surgery.     *You should not have alcoholic beverages for 24 hours before surgery.     *You should not smoke 24 hours prior to surgery.     *To help prevent surgical infections bathe/shower with Dial soap the evening before surgery.    *You can wear deodorant but no lotion, powder, or perfume/cologne. You should remove all make-up and nail polish at home.    *If you wear glasses, please bring a case for the glasses with you.    *You will be asked to remove dentures and contacts.     *Please leave all valuables at home.    *You should wear loose, comfortable clothing that will accommodate bandages and/or casts.    *You should notify your doctor of any change in your condition (fever, cold, rash, etc). Surgery may need to be re-scheduled until a time you are in better health.    *A responsible adult is required to accompany you to and from the hospital if you are receiving anesthesia or a sedative. Patients are not permitted to drive for 24 hours after anesthesia.     *You can use the "[employer]g if you wish.     *If you have any further questions please call [phone].  Pt. Instructed not to take metformin and valsartan dos, no insulin am of or, and to check with primary doctor for insulin  instructions night before procedure.   -0.9

## 2024-03-05 ENCOUNTER — ANESTHESIA EVENT (OUTPATIENT)
Dept: OPERATING ROOM | Facility: HOSPITAL | Age: 69
End: 2024-03-05
Payer: MEDICARE

## 2024-03-06 ENCOUNTER — HOSPITAL ENCOUNTER (OUTPATIENT)
Dept: OPERATING ROOM | Facility: HOSPITAL | Age: 69
Setting detail: OUTPATIENT SURGERY
Discharge: HOME | End: 2024-03-06
Payer: MEDICARE

## 2024-03-06 ENCOUNTER — ANESTHESIA (OUTPATIENT)
Dept: OPERATING ROOM | Facility: HOSPITAL | Age: 69
End: 2024-03-06
Payer: MEDICARE

## 2024-03-06 ENCOUNTER — HOSPITAL ENCOUNTER (OUTPATIENT)
Dept: CARDIOLOGY | Facility: HOSPITAL | Age: 69
Discharge: HOME | End: 2024-03-06
Payer: MEDICARE

## 2024-03-06 VITALS
DIASTOLIC BLOOD PRESSURE: 85 MMHG | HEIGHT: 71 IN | SYSTOLIC BLOOD PRESSURE: 122 MMHG | BODY MASS INDEX: 30.12 KG/M2 | TEMPERATURE: 97.5 F | OXYGEN SATURATION: 100 % | RESPIRATION RATE: 18 BRPM | WEIGHT: 215.17 LBS | HEART RATE: 66 BPM

## 2024-03-06 DIAGNOSIS — R10.13 EPIGASTRIC PAIN: ICD-10-CM

## 2024-03-06 DIAGNOSIS — Z12.11 COLON CANCER SCREENING: ICD-10-CM

## 2024-03-06 DIAGNOSIS — K29.00 ACUTE GASTRITIS WITHOUT HEMORRHAGE, UNSPECIFIED GASTRITIS TYPE: Primary | ICD-10-CM

## 2024-03-06 PROBLEM — K21.9 GASTROESOPHAGEAL REFLUX DISEASE: Status: ACTIVE | Noted: 2024-03-06

## 2024-03-06 LAB — GLUCOSE BLD MANUAL STRIP-MCNC: 250 MG/DL (ref 74–99)

## 2024-03-06 PROCEDURE — 3700000002 HC GENERAL ANESTHESIA TIME - EACH INCREMENTAL 1 MINUTE: Performed by: NURSE ANESTHETIST, CERTIFIED REGISTERED

## 2024-03-06 PROCEDURE — 93010 ELECTROCARDIOGRAM REPORT: CPT | Performed by: INTERNAL MEDICINE

## 2024-03-06 PROCEDURE — 3600000007 HC OR TIME - EACH INCREMENTAL 1 MINUTE - PROCEDURE LEVEL TWO: Performed by: NURSE ANESTHETIST, CERTIFIED REGISTERED

## 2024-03-06 PROCEDURE — 43239 EGD BIOPSY SINGLE/MULTIPLE: CPT | Performed by: INTERNAL MEDICINE

## 2024-03-06 PROCEDURE — 2500000005 HC RX 250 GENERAL PHARMACY W/O HCPCS: Performed by: NURSE ANESTHETIST, CERTIFIED REGISTERED

## 2024-03-06 PROCEDURE — 2500000004 HC RX 250 GENERAL PHARMACY W/ HCPCS (ALT 636 FOR OP/ED): Performed by: ANESTHESIOLOGY

## 2024-03-06 PROCEDURE — 2720000007 HC OR 272 NO HCPCS: Performed by: NURSE ANESTHETIST, CERTIFIED REGISTERED

## 2024-03-06 PROCEDURE — 7100000010 HC PHASE TWO TIME - EACH INCREMENTAL 1 MINUTE: Performed by: NURSE ANESTHETIST, CERTIFIED REGISTERED

## 2024-03-06 PROCEDURE — 7100000009 HC PHASE TWO TIME - INITIAL BASE CHARGE: Performed by: NURSE ANESTHETIST, CERTIFIED REGISTERED

## 2024-03-06 PROCEDURE — 88305 TISSUE EXAM BY PATHOLOGIST: CPT | Performed by: PATHOLOGY

## 2024-03-06 PROCEDURE — 0753T DGTZ GLS MCRSCP SLD LEVEL IV: CPT | Mod: TC,GEALAB | Performed by: INTERNAL MEDICINE

## 2024-03-06 PROCEDURE — 87900 PHENOTYPE INFECT AGENT DRUG: CPT | Performed by: INTERNAL MEDICINE

## 2024-03-06 PROCEDURE — 93005 ELECTROCARDIOGRAM TRACING: CPT | Mod: 59

## 2024-03-06 PROCEDURE — 82947 ASSAY GLUCOSE BLOOD QUANT: CPT

## 2024-03-06 PROCEDURE — 45390 COLONOSCOPY W/RESECTION: CPT | Performed by: INTERNAL MEDICINE

## 2024-03-06 PROCEDURE — 3600000002 HC OR TIME - INITIAL BASE CHARGE - PROCEDURE LEVEL TWO: Performed by: NURSE ANESTHETIST, CERTIFIED REGISTERED

## 2024-03-06 PROCEDURE — A45390 PR COLONOSCOPY FLX W/ENDOSCOPIC MUCOSAL RESECTION: Performed by: NURSE ANESTHETIST, CERTIFIED REGISTERED

## 2024-03-06 PROCEDURE — 2500000004 HC RX 250 GENERAL PHARMACY W/ HCPCS (ALT 636 FOR OP/ED): Performed by: NURSE ANESTHETIST, CERTIFIED REGISTERED

## 2024-03-06 PROCEDURE — 3700000001 HC GENERAL ANESTHESIA TIME - INITIAL BASE CHARGE: Performed by: NURSE ANESTHETIST, CERTIFIED REGISTERED

## 2024-03-06 RX ORDER — FENTANYL CITRATE 50 UG/ML
INJECTION, SOLUTION INTRAMUSCULAR; INTRAVENOUS AS NEEDED
Status: DISCONTINUED | OUTPATIENT
Start: 2024-03-06 | End: 2024-03-06

## 2024-03-06 RX ORDER — SODIUM CHLORIDE, SODIUM LACTATE, POTASSIUM CHLORIDE, CALCIUM CHLORIDE 600; 310; 30; 20 MG/100ML; MG/100ML; MG/100ML; MG/100ML
100 INJECTION, SOLUTION INTRAVENOUS CONTINUOUS
Status: DISCONTINUED | OUTPATIENT
Start: 2024-03-06 | End: 2024-03-07 | Stop reason: HOSPADM

## 2024-03-06 RX ORDER — PROPOFOL 10 MG/ML
INJECTION, EMULSION INTRAVENOUS AS NEEDED
Status: DISCONTINUED | OUTPATIENT
Start: 2024-03-06 | End: 2024-03-06

## 2024-03-06 RX ORDER — MIDAZOLAM HYDROCHLORIDE 1 MG/ML
INJECTION INTRAMUSCULAR; INTRAVENOUS AS NEEDED
Status: DISCONTINUED | OUTPATIENT
Start: 2024-03-06 | End: 2024-03-06

## 2024-03-06 RX ORDER — PROPOFOL 10 MG/ML
INJECTION, EMULSION INTRAVENOUS CONTINUOUS PRN
Status: DISCONTINUED | OUTPATIENT
Start: 2024-03-06 | End: 2024-03-06

## 2024-03-06 RX ORDER — PANTOPRAZOLE SODIUM 40 MG/1
40 TABLET, DELAYED RELEASE ORAL
Qty: 90 TABLET | Refills: 0 | Status: SHIPPED | OUTPATIENT
Start: 2024-03-06 | End: 2025-03-06

## 2024-03-06 RX ORDER — LIDOCAINE HYDROCHLORIDE 10 MG/ML
INJECTION, SOLUTION EPIDURAL; INFILTRATION; INTRACAUDAL; PERINEURAL AS NEEDED
Status: DISCONTINUED | OUTPATIENT
Start: 2024-03-06 | End: 2024-03-06

## 2024-03-06 RX ORDER — DEXTROMETHORPHAN HYDROBROMIDE, GUAIFENESIN 5; 100 MG/5ML; MG/5ML
650 LIQUID ORAL DAILY
COMMUNITY

## 2024-03-06 RX ADMIN — PROPOFOL 100 MCG/KG/MIN: 10 INJECTION, EMULSION INTRAVENOUS at 10:04

## 2024-03-06 RX ADMIN — MIDAZOLAM HYDROCHLORIDE 2 MG: 1 INJECTION, SOLUTION INTRAMUSCULAR; INTRAVENOUS at 10:02

## 2024-03-06 RX ADMIN — PROPOFOL 70 MG: 10 INJECTION, EMULSION INTRAVENOUS at 10:02

## 2024-03-06 RX ADMIN — LIDOCAINE HYDROCHLORIDE 5 ML: 10 INJECTION, SOLUTION EPIDURAL; INFILTRATION; INTRACAUDAL; PERINEURAL at 10:02

## 2024-03-06 RX ADMIN — FENTANYL CITRATE 25 MCG: 50 INJECTION, SOLUTION INTRAMUSCULAR; INTRAVENOUS at 10:13

## 2024-03-06 RX ADMIN — FENTANYL CITRATE 25 MCG: 50 INJECTION, SOLUTION INTRAMUSCULAR; INTRAVENOUS at 10:19

## 2024-03-06 RX ADMIN — FENTANYL CITRATE 50 MCG: 50 INJECTION, SOLUTION INTRAMUSCULAR; INTRAVENOUS at 10:02

## 2024-03-06 RX ADMIN — SODIUM CHLORIDE, POTASSIUM CHLORIDE, SODIUM LACTATE AND CALCIUM CHLORIDE: 600; 310; 30; 20 INJECTION, SOLUTION INTRAVENOUS at 09:58

## 2024-03-06 RX ADMIN — SODIUM CHLORIDE, POTASSIUM CHLORIDE, SODIUM LACTATE AND CALCIUM CHLORIDE 100 ML/HR: 600; 310; 30; 20 INJECTION, SOLUTION INTRAVENOUS at 09:48

## 2024-03-06 SDOH — HEALTH STABILITY: MENTAL HEALTH: CURRENT SMOKER: 1

## 2024-03-06 ASSESSMENT — ENCOUNTER SYMPTOMS
NAUSEA: 1
ABDOMINAL PAIN: 1
DIARRHEA: 1

## 2024-03-06 ASSESSMENT — PAIN SCALES - GENERAL: PAIN_LEVEL: 0

## 2024-03-06 NOTE — ANESTHESIA PREPROCEDURE EVALUATION
Patient: Tyshawn Wilkerson    Procedure Information       Date/Time: 03/06/24 1000    Scheduled providers: Dany Malagon MD    Procedures:       COLONOSCOPY      EGD    Location: [facility] OR            Relevant Problems   Anesthesia (within normal limits)      Cardiovascular   (+) Essential hypertension   (+) Exertional angina   (+) Hyperlipidemia   (+) Peripheral vascular disease (CMS/HCC)      Endocrine   (+) Obesity   (+) Obesity due to energy imbalance      /Renal   (+) Stage 3a chronic kidney disease (CMS/HCC)   (+) Urinary tract infection      Neuro/Psych   (+) Depression with anxiety   (+) Depression, recurrent (CMS/HCC)      Pulmonary (within normal limits)      Musculoskeletal   (+) DJD (degenerative joint disease) of knee   (+) Osteoarthritis      Infectious Disease   (+) Disease due to severe acute respiratory syndrome coronavirus 2 (SARS-CoV-2)   (+) Urinary tract infection     Vitals:    03/06/24 0845   BP: 148/86   Pulse: 83   Resp: 16   Temp: 36.4 °C (97.5 °F)   SpO2: 98%       Past Surgical History:   Procedure Laterality Date    KNEE SURGERY  07/17/2013    Knee Surgery    OTHER SURGICAL HISTORY  07/17/2013    Wrist Carpectomy    OTHER SURGICAL HISTORY  07/17/2013    Shoulder Surgery Left    OTHER SURGICAL HISTORY  07/28/2021    Prostate surgery    OTHER SURGICAL HISTORY  07/28/2021    Colonoscopy (Fiberoptic) Sigmoid Colon    TOTAL KNEE ARTHROPLASTY Right      Past Medical History:   Diagnosis Date    Anxiety     Chronic low back pain     CKD (chronic kidney disease), stage III (CMS/HCC)     Class 1 obesity with body mass index (BMI) of 31.0 to 31.9 in adult 02/07/2024    Colon cancer screening 03/06/2024    Depression     DM (diabetes mellitus) (CMS/HCC)     Hx poor control    Elevated PSA     Epigastric pain     Exertional angina 2018    Per hx    HLD (hyperlipidemia)     HTN (hypertension)     Hx of colonic polyp 2018    Other conditions influencing health status      "Screening for malignant neoplasms, colon    Pain in left shoulder     Pain in joint of left shoulder    Personal history of diseases of the skin and subcutaneous tissue     History of abscess of skin and subcutaneous tissue    Personal history of other specified conditions     History of diarrhea    Prostate cancer (CMS/HCC)     [facility]       Current Outpatient Medications:     acetaminophen (Tylenol 8 HOUR) 650 mg ER tablet, Take 1 tablet (650 mg) by mouth once daily. Do not crush, chew, or split., Disp: , Rfl:     atorvastatin (Lipitor) 20 mg tablet, Take 1 tablet (20 mg) by mouth once daily at bedtime., Disp: 90 tablet, Rfl: 0    cholecalciferol (Vitamin D3) 50 MCG (2000 UT) tablet, TAKE 1 TABLET BY MOUTH ONCE DAILY., Disp: 90 tablet, Rfl: 0    HumaLOG U-100 Insulin 100 unit/mL injection, Inject under the skin 3 times a day with meals. Inject 10-20 units per sliding scale three times daily., Disp: 10 mL, Rfl: 2    Levemir U-100 Insulin 100 unit/mL injection, Inject 50 Units under the skin once daily at bedtime. Take as directed per insulin instructions., Disp: 15 mL, Rfl: 2    metFORMIN (Glucophage) 1,000 mg tablet, Take 1 tablet (1,000 mg) by mouth 2 times a day with meals., Disp: 180 tablet, Rfl: 0    valsartan-hydrochlorothiazide (Diovan-HCT) 160-12.5 mg tablet, Take 1 tablet by mouth once daily., Disp: 90 tablet, Rfl: 0    Accu-Chek Rosie Plus test strp strip, use 1 TEST STRIP to TEST BLOOD SUGAR four times a day, Disp: 100 strip, Rfl: 11    acetaminophen (Tylenol) 325 mg suppository, Insert into the rectum., Disp: , Rfl:     BD Insulin Syringe, half unit, 0.3 mL 31 gauge x 5/16\" syringe, inject 30 units OF LEVEMIR daily and 6 to 12 units OF HUMALOG upto 3 times  daily (Patient not taking: Reported on 3/6/2024), Disp: 400 each, Rfl: 3    insulin syringe-needle U-100 1 mL 28 gauge syringe, Inject 1 Syringe under the skin once daily at bedtime. Inject 50 units subcutaneous once daily at bedtime.  Take as directed " "per insulin instructions., Disp: 100 each, Rfl: 3    insulin syringe-needle U-100 1 mL 28 gauge x 1/2\" syringe, PLEASE SEE ATTACHED FOR DETAILED DIRECTIONS, Disp: , Rfl:     Current Facility-Administered Medications:     lactated Ringer's infusion, 100 mL/hr, intravenous, Continuous, Wilfredo Heaton MD  Prior to Admission medications    Medication Sig Start Date End Date Taking? Authorizing Provider   acetaminophen (Tylenol 8 HOUR) 650 mg ER tablet Take 1 tablet (650 mg) by mouth once daily. Do not crush, chew, or split.   Yes Historical Provider, MD   atorvastatin (Lipitor) 20 mg tablet Take 1 tablet (20 mg) by mouth once daily at bedtime. 12/21/23 3/20/24 Yes Jessica Moralez MD   cholecalciferol (Vitamin D3) 50 MCG (2000 UT) tablet TAKE 1 TABLET BY MOUTH ONCE DAILY. 1/15/24  Yes Jessica Moralez MD   HumaLOG U-100 Insulin 100 unit/mL injection Inject under the skin 3 times a day with meals. Inject 10-20 units per sliding scale three times daily. 12/21/23  Yes Jessica Moralez MD   Levemir U-100 Insulin 100 unit/mL injection Inject 50 Units under the skin once daily at bedtime. Take as directed per insulin instructions. 12/21/23 3/20/24 Yes Jessica Moralez MD   metFORMIN (Glucophage) 1,000 mg tablet Take 1 tablet (1,000 mg) by mouth 2 times a day with meals. 12/21/23  Yes Jessica Moralez MD   valsartan-hydrochlorothiazide (Diovan-HCT) 160-12.5 mg tablet Take 1 tablet by mouth once daily. 12/21/23 3/20/24 Yes Jessica Moralez MD   Accu-Chek Rosie Plus test strp strip use 1 TEST STRIP to TEST BLOOD SUGAR four times a day 12/21/23   Jessica Moralez MD   acetaminophen (Tylenol) 325 mg suppository Insert into the rectum.    Historical Provider, MD   BD Insulin Syringe, half unit, 0.3 mL 31 gauge x 5/16\" syringe inject 30 units OF LEVEMIR daily and 6 to 12 units OF HUMALOG upto 3 times  daily  Patient not taking: Reported on 3/6/2024 12/21/23   Jessica Moralez MD   insulin " "syringe-needle U-100 1 mL 28 gauge syringe Inject 1 Syringe under the skin once daily at bedtime. Inject 50 units subcutaneous once daily at bedtime.  Take as directed per insulin instructions. 12/21/23   Jessica Moralez MD   insulin syringe-needle U-100 1 mL 28 gauge x 1/2\" syringe PLEASE SEE ATTACHED FOR DETAILED DIRECTIONS 12/22/23   Historical Provider, MD     No Known Allergies  Social History     Tobacco Use    Smoking status: Former     Types: Cigarettes     Passive exposure: Never    Smokeless tobacco: Never   Substance Use Topics    Alcohol use: Yes     Comment: occasionally         Chemistry    Lab Results   Component Value Date/Time     12/11/2023 0830    K 4.5 12/11/2023 0830     12/11/2023 0830    CO2 27 12/11/2023 0830    BUN 24 (H) 12/11/2023 0830    CREATININE 1.42 (H) 12/11/2023 0830    Lab Results   Component Value Date/Time    CALCIUM 8.8 12/11/2023 0830    ALKPHOS 66 12/11/2023 0830    AST 14 12/11/2023 0830    ALT 21 12/11/2023 0830    BILITOT 0.9 12/11/2023 0830          Lab Results   Component Value Date/Time    WBC 5.0 12/11/2023 0830    HGB 15.1 12/11/2023 0830    HCT 44.4 12/11/2023 0830     12/11/2023 0830     Lab Results   Component Value Date/Time    PROTIME 10.4 08/11/2020 0740    INR 0.9 08/11/2020 0740     Clinical information reviewed:   Tobacco  Allergies  Meds   Med Hx  Surg Hx   Fam Hx  Soc Hx        NPO Detail:  NPO/Void Status  Date of Last Liquid: 03/06/24  Time of Last Liquid: 0330  Date of Last Solid: 03/04/24  Last Intake Type: GI prep         Physical Exam    Airway  Mallampati: III  TM distance: >3 FB  Neck ROM: full     Cardiovascular   Rhythm: regular     Dental        Pulmonary   Breath sounds clear to auscultation     Abdominal            Anesthesia Plan    History of general anesthesia?: yes  History of complications of general anesthesia?: no    ASA 3     The patient is a current smoker.  Patient did not smoke on day of " procedure.    Anesthetic plan and risks discussed with patient.  Use of blood products discussed with patient who consented to blood products.    Plan discussed with CRNA.

## 2024-03-06 NOTE — ANESTHESIA POSTPROCEDURE EVALUATION
Patient: Tyshawn Wilkerson    Procedure Summary       Date: 03/06/24 Room / Location: [facility] OR    Anesthesia Start: 0958 Anesthesia Stop: 1048    Procedures:       COLONOSCOPY      EGD Diagnosis:       Colon cancer screening      Epigastric pain    Scheduled Providers: Dany Malagon MD Responsible Provider: YISEL Hernandez    Anesthesia Type: MAC ASA Status: 3            Anesthesia Type: MAC    Vitals Value Taken Time   /85 03/06/24 1115   Temp 36.4 °C (97.5 °F) 03/06/24 1045   Pulse 66 03/06/24 1115   Resp 18 03/06/24 1115   SpO2 100 % 03/06/24 1115       Anesthesia Post Evaluation    Patient location during evaluation: PACU  Patient participation: complete - patient participated  Level of consciousness: awake and alert  Pain management: satisfactory to patient  Airway patency: patent  Cardiovascular status: acceptable and blood pressure returned to baseline  Respiratory status: acceptable  Hydration status: acceptable  Postoperative Nausea and Vomiting: none        There were no known notable events for this encounter.

## 2024-03-06 NOTE — DISCHARGE INSTRUCTIONS
Patient Instructions after a Colonoscopy      The anesthetics, sedatives or narcotics which were given to you today will be acting in your body for the next 24 hours, so you might feel a little sleepy or groggy.  This feeling should slowly wear off. Carefully read and follow the instructions.     You received sedation today:  - Do not drive or operate any machinery or power tools of any kind.   - No alcoholic beverages today, not even beer or wine.  - Do not make any important decisions or sign any legal documents.  - No over the counter medications that contain alcohol or that may cause drowsiness.  - Do not make any important decisions or sign any legal documents.    While it is common to experience mild to moderate abdominal distention, gas, or belching after your procedure, if any of these symptoms occur following discharge from the GI Lab or within one week of having your procedure, call the [facility] to be advised whether a visit to your nearest Urgent Care or Emergency Department is indicated.  Take this paper with you if you go.     - If you develop an allergic reaction to the medications that were given during your procedure such as difficulty breathing, rash, hives, severe nausea, vomiting or lightheadedness.  - If you experience chest pain, shortness of breath, severe abdominal pain, fevers and chills.  -If you develop signs and symptoms of bleeding such as blood in your spit, if your stools turn black, tarry, or bloody  - If you have not urinated within 8 hours following your procedure.  - If your IV site becomes painful, red, inflamed, or looks infected.    If you received a biopsy/polypectomy/sphincterotomy the following instructions apply below:    __ Do not use Aspirin containing products, non-steroidal medications or anti-coagulants for one week following your procedure. (Examples of these types of medications are: Advil, Arthrotec, Aleve, Coumadin, Ecotrin, Heparin, Ibuprofen,  Indocin, Motrin, Naprosyn, Nuprin, Plavix, Vioxx, and Voltarin, or their generic forms.  This list is not all-inclusive.  Check with your physician or pharmacist before resuming medications.)   __ Eat a soft diet today.  Avoid foods that are poorly digested for the next 24 hours.  These foods would include: nuts, beans, lettuce, red meats, and fried foods. Start with liquids and advance your diet as tolerated, gradually work up to eating solids.   __ Do not have a Barium Study or Enema for one week.    Your physician recommends the additional following instructions:    -You have a contact number available for emergencies. The signs and symptoms of potential delayed complications were discussed with you. You may return to normal activities tomorrow.  -Resume your previous diet.  -Continue your present medications.   -We are waiting for your pathology results.  -Your physician has recommended a repeat colonoscopy (date to be determined after pending pathology results are reviewed) for surveillance based on pathology results.  -The findings and recommendations have been discussed with you.  -The findings and recommendations were discussed with your family.  - Please see Medication Reconciliation Form for new medication/medications prescribed.       If you experience any problems or have any questions following discharge from the GI Lab, please call:        Nurse Signature                                                                        Date___________________                                                                            Patient/Responsible Party Signature                                        Date___________________Patient Instructions after an endoscopy or colonoscopy      The anesthetics, sedatives or narcotics which were given to you today will be acting in your body for the next 24 hours, so you might feel a little sleepy or groggy.  This feeling should slowly wear off. Carefully read and follow  the instructions.     You received sedation today:  - Do not drive or operate any machinery or power tools of any kind.   - No alcoholic beverages today, not even beer or wine.  - Do not make any important decisions or sign any legal documents.  - No over the counter medications that contain alcohol or that may cause drowsiness.  - Do not make any important decisions or sign any legal documents.    While it is common to experience mild to moderate abdominal distention, gas, or belching after your procedure, if any of these symptoms occur following discharge from the GI Lab or within one week of having your procedure, call the [facility] to be advised whether a visit to your nearest Urgent Care or Emergency Department is indicated.  Take this paper with you if you go.     - If you develop an allergic reaction to the medications that were given during your procedure such as difficulty breathing, rash, hives, severe nausea, vomiting or lightheadedness.- If you experience chest pain, shortness of breath, severe abdominal pain, fevers and chills.    -If you develop signs and symptoms of bleeding such as blood in your spit, if your stools turn black, tarry, or bloody    - If you have not urinated within 8 hours following your procedure.- If your IV site becomes painful, red, inflamed, or looks infected.

## 2024-03-06 NOTE — H&P
History Of Present Illness  Tyshawn Wilkerson is a 68 y.o. male presenting to GI lab for both EGD, colonoscopy  Past Medical History  Past Medical History:   Diagnosis Date    Anxiety     Chronic low back pain     CKD (chronic kidney disease), stage III (CMS/[facility])     Class 1 obesity with body mass index (BMI) of 31.0 to 31.9 in adult 02/07/2024    Colon cancer screening 03/06/2024    Depression     DM (diabetes mellitus) (CMS/HCC)     Hx poor control    Elevated PSA     Epigastric pain     Exertional angina 2018    Per hx    HLD (hyperlipidemia)     HTN (hypertension)     Hx of colonic polyp 2018    Other conditions influencing health status     Screening for malignant neoplasms, colon    Pain in left shoulder     Pain in joint of left shoulder    Personal history of diseases of the skin and subcutaneous tissue     History of abscess of skin and subcutaneous tissue    Personal history of other specified conditions     History of diarrhea    Prostate cancer (CMS/[facility])     PSH       Surgical History  Past Surgical History:   Procedure Laterality Date    KNEE SURGERY  07/17/2013    Knee Surgery    OTHER SURGICAL HISTORY  07/17/2013    Wrist Carpectomy    OTHER SURGICAL HISTORY  07/17/2013    Shoulder Surgery Left    OTHER SURGICAL HISTORY  07/28/2021    Prostate surgery    OTHER SURGICAL HISTORY  07/28/2021    Colonoscopy (Fiberoptic) Sigmoid Colon    TOTAL KNEE ARTHROPLASTY Right         Social History  He reports that he has quit smoking. His smoking use included cigarettes. He has never been exposed to tobacco smoke. He has never used smokeless tobacco. He reports current alcohol use. He reports current drug use. Drug: Marijuana.    Family History  Family History   Problem Relation Name Age of Onset    Diabetes Mother      Coronary artery disease Father      Lung cancer Maternal Grandfather      Colon cancer Paternal Grandfather          Allergies  Patient has no known allergies.    Review of Systems  "  Gastrointestinal:  Positive for abdominal pain, diarrhea and nausea.        Physical Exam  Cardiovascular:      Rate and Rhythm: Normal rate and regular rhythm.   Pulmonary:      Effort: Pulmonary effort is normal.      Breath sounds: Normal breath sounds.   Neurological:      Mental Status: He is alert and oriented to person, place, and time.          Last Recorded Vitals  Blood pressure 148/86, pulse 83, temperature 36.4 °C (97.5 °F), temperature source Temporal, resp. rate 16, height 1.803 m (5' 11\"), weight 97.6 kg (215 lb 2.7 oz), SpO2 98 %.    Relevant Results             Assessment/Plan   Active Problems:  There are no active Hospital Problems.         EGD  Colonoscopy          Dany Malagon MD    "

## 2024-03-07 ENCOUNTER — OFFICE VISIT (OUTPATIENT)
Dept: PRIMARY CARE | Facility: CLINIC | Age: 69
End: 2024-03-07
Payer: MEDICARE

## 2024-03-07 VITALS
BODY MASS INDEX: 30.43 KG/M2 | HEART RATE: 82 BPM | SYSTOLIC BLOOD PRESSURE: 118 MMHG | WEIGHT: 218.2 LBS | OXYGEN SATURATION: 97 % | DIASTOLIC BLOOD PRESSURE: 76 MMHG

## 2024-03-07 DIAGNOSIS — I73.9 PERIPHERAL VASCULAR DISEASE (CMS-HCC): ICD-10-CM

## 2024-03-07 DIAGNOSIS — M19.90 OSTEOARTHRITIS, UNSPECIFIED OSTEOARTHRITIS TYPE, UNSPECIFIED SITE: ICD-10-CM

## 2024-03-07 DIAGNOSIS — E66.09 CLASS 1 OBESITY DUE TO EXCESS CALORIES WITH SERIOUS COMORBIDITY AND BODY MASS INDEX (BMI) OF 30.0 TO 30.9 IN ADULT: ICD-10-CM

## 2024-03-07 DIAGNOSIS — E78.00 HYPERCHOLESTEROLEMIA: ICD-10-CM

## 2024-03-07 DIAGNOSIS — C61 PROSTATE CANCER (MULTI): ICD-10-CM

## 2024-03-07 DIAGNOSIS — F33.9 DEPRESSION, RECURRENT (CMS-HCC): ICD-10-CM

## 2024-03-07 DIAGNOSIS — E11.69 TYPE 2 DIABETES MELLITUS WITH OTHER SPECIFIED COMPLICATION, WITH LONG-TERM CURRENT USE OF INSULIN (MULTI): Primary | ICD-10-CM

## 2024-03-07 DIAGNOSIS — Z79.4 TYPE 2 DIABETES MELLITUS WITH OTHER SPECIFIED COMPLICATION, WITH LONG-TERM CURRENT USE OF INSULIN (MULTI): Primary | ICD-10-CM

## 2024-03-07 DIAGNOSIS — Z86.010 HX OF COLONIC POLYP: ICD-10-CM

## 2024-03-07 DIAGNOSIS — F12.90 MARIJUANA SMOKER: ICD-10-CM

## 2024-03-07 DIAGNOSIS — I10 BENIGN ESSENTIAL HYPERTENSION: ICD-10-CM

## 2024-03-07 DIAGNOSIS — E55.9 VITAMIN D DEFICIENCY: ICD-10-CM

## 2024-03-07 DIAGNOSIS — N18.31 STAGE 3A CHRONIC KIDNEY DISEASE (MULTI): ICD-10-CM

## 2024-03-07 PROCEDURE — 3078F DIAST BP <80 MM HG: CPT | Performed by: INTERNAL MEDICINE

## 2024-03-07 PROCEDURE — 1126F AMNT PAIN NOTED NONE PRSNT: CPT | Performed by: INTERNAL MEDICINE

## 2024-03-07 PROCEDURE — 3008F BODY MASS INDEX DOCD: CPT | Performed by: INTERNAL MEDICINE

## 2024-03-07 PROCEDURE — 1159F MED LIST DOCD IN RCRD: CPT | Performed by: INTERNAL MEDICINE

## 2024-03-07 PROCEDURE — 1160F RVW MEDS BY RX/DR IN RCRD: CPT | Performed by: INTERNAL MEDICINE

## 2024-03-07 PROCEDURE — 1036F TOBACCO NON-USER: CPT | Performed by: INTERNAL MEDICINE

## 2024-03-07 PROCEDURE — 3074F SYST BP LT 130 MM HG: CPT | Performed by: INTERNAL MEDICINE

## 2024-03-07 PROCEDURE — 99214 OFFICE O/P EST MOD 30 MIN: CPT | Performed by: INTERNAL MEDICINE

## 2024-03-07 RX ORDER — CHOLECALCIFEROL (VITAMIN D3) 50 MCG
2000 TABLET ORAL DAILY
Qty: 90 TABLET | Refills: 0 | Status: SHIPPED | OUTPATIENT
Start: 2024-03-07 | End: 2024-06-08 | Stop reason: SDUPTHER

## 2024-03-07 RX ORDER — VALSARTAN AND HYDROCHLOROTHIAZIDE 160; 12.5 MG/1; MG/1
1 TABLET, FILM COATED ORAL DAILY
Qty: 90 TABLET | Refills: 0 | Status: SHIPPED | OUTPATIENT
Start: 2024-03-07 | End: 2024-06-10

## 2024-03-07 RX ORDER — INSULIN DETEMIR 100 [IU]/ML
50 INJECTION, SOLUTION SUBCUTANEOUS NIGHTLY
Qty: 15 ML | Refills: 0 | Status: SHIPPED | OUTPATIENT
Start: 2024-03-07 | End: 2024-04-08

## 2024-03-07 RX ORDER — METFORMIN HYDROCHLORIDE 1000 MG/1
1000 TABLET ORAL
Qty: 180 TABLET | Refills: 0 | Status: SHIPPED | OUTPATIENT
Start: 2024-03-07 | End: 2024-06-10

## 2024-03-07 RX ORDER — ATORVASTATIN CALCIUM 20 MG/1
20 TABLET, FILM COATED ORAL NIGHTLY
Qty: 90 TABLET | Refills: 0 | Status: SHIPPED | OUTPATIENT
Start: 2024-03-07 | End: 2024-06-05

## 2024-03-07 NOTE — PROGRESS NOTES
Subjective   Patient ID: Tyshawn Wilkerson is a 68 y.o. male who presents for Follow-up (3 mth ).  HPI    Routine follow up    Diarrhea using pepto bismol  H/o colon polyps  Colonoscopy large 20 mm polyp   recheck 6 months  EGD inflammation on protonix    Cold feet x chronic  Claudication sx     back pain predominantly right-sided with right-sided hip pain better  Fed up with pharmacy  stopped med     DM FBS not checked  this a.m. on insulin better no side effects  Follow up optho  HBA1C 6.3  12-23  nephropathy     Hypercholesterolemia on therapy no side effects     Hypertension stable on therapy no side effects     Prostate cancer  Didn't see urology     Smokes marijuana drinks beer  smoking and alcohol  cessation discussed      Diet and exercise reviewed    Review of Systems   All other systems reviewed and are negative.      Objective   /76   Pulse 82   Wt 99 kg (218 lb 3.2 oz)   SpO2 97%   BMI 30.43 kg/m²   Lab Results   Component Value Date    WBC 5.0 12/11/2023    HGB 15.1 12/11/2023    HCT 44.4 12/11/2023     12/11/2023    CHOL 138 12/11/2023    TRIG 147 12/11/2023    HDL 54.7 12/11/2023    ALT 21 12/11/2023    AST 14 12/11/2023     12/11/2023    K 4.5 12/11/2023     12/11/2023    CREATININE 1.42 (H) 12/11/2023    BUN 24 (H) 12/11/2023    CO2 27 12/11/2023    TSH 2.01 12/11/2023    PSA <0.10 12/11/2023    INR 0.9 08/11/2020    HGBA1C 6.3 (H) 12/11/2023           Physical Exam  Vitals reviewed.   Constitutional:       Appearance: Normal appearance. He is obese.   HENT:      Head: Normocephalic and atraumatic.      Mouth/Throat:      Pharynx: No posterior oropharyngeal erythema.   Eyes:      General: No scleral icterus.     Conjunctiva/sclera: Conjunctivae normal.      Pupils: Pupils are equal, round, and reactive to light.   Cardiovascular:      Rate and Rhythm: Normal rate and regular rhythm.      Heart sounds: Normal heart sounds.   Pulmonary:      Effort: No respiratory  distress.      Breath sounds: No wheezing.   Abdominal:      General: Abdomen is flat. Bowel sounds are normal. There is no distension.      Palpations: Abdomen is soft. There is no mass.      Tenderness: There is no abdominal tenderness. There is no rebound.   Musculoskeletal:         General: Normal range of motion.      Cervical back: Normal range of motion and neck supple.   Skin:     General: Skin is warm and dry.   Neurological:      General: No focal deficit present.      Mental Status: He is alert and oriented to person, place, and time. Mental status is at baseline.   Psychiatric:         Mood and Affect: Mood normal.         Behavior: Behavior normal.         Thought Content: Thought content normal.         Judgment: Judgment normal.         Problem List Items Addressed This Visit             ICD-10-CM    Osteoarthritis M19.90    Relevant Orders    Disability Placard    Prostate cancer (CMS/[facility]) C61    Stage 3a chronic kidney disease (CMS/[facility]) N18.31    Vitamin D deficiency E55.9    Relevant Medications    cholecalciferol (Vitamin D3) 50 MCG (2000 UT) tablet    Depression, recurrent (CMS/[facility]) F33.9    Obesity E66.9    Peripheral vascular disease (CMS/[facility]) I73.9     Other Visit Diagnoses         Codes    Type 2 diabetes mellitus with other specified complication, with long-term current use of insulin (CMS/[facility])    -  Primary E11.69, Z79.4    Relevant Medications    Levemir U-100 Insulin 100 unit/mL injection    metFORMIN (Glucophage) 1,000 mg tablet    Hx of colonic polyp     Z86.010    Hypercholesterolemia     E78.00    Relevant Medications    atorvastatin (Lipitor) 20 mg tablet    Benign essential hypertension     I10    Relevant Medications    valsartan-hydrochlorothiazide (Diovan-HCT) 160-12.5 mg tablet    Marijuana smoker     F12.90          Assessment/Plan     Diarrhea using pepto bismol  H/o colon polyps  Colonoscopy large 20 mm polyp   recheck 6 months  EGD inflammation on protonix    Cold feet x  chronic  Claudication sx     back pain predominantly right-sided with right-sided hip pain better  Fed up with pharmacy  stopped med     DM FBS not checked  this a.m. on insulin better no side effects  Follow up optho  HBA1C 6.3  12-23  nephropathy     Hypercholesterolemia on therapy no side effects     Hypertension stable on therapy no side effects     Prostate cancer  Didn't see urology     Smokes marijuana drinks beer  smoking and alcohol  cessation discussed      Diet and exercise reviewed    Prostate 9-23  Colonoscopy 3-24  recheck 9-24  CT chest lung cancer screening n/a  immunizations rev'd  BMI 30.4    Follow up 3 months

## 2024-03-10 LAB
ATRIAL RATE: 80 BPM
P AXIS: 33 DEGREES
P OFFSET: 189 MS
P ONSET: 136 MS
PR INTERVAL: 172 MS
Q ONSET: 222 MS
QRS COUNT: 13 BEATS
QRS DURATION: 100 MS
QT INTERVAL: 398 MS
QTC CALCULATION(BAZETT): 459 MS
QTC FREDERICIA: 438 MS
R AXIS: -20 DEGREES
T AXIS: 30 DEGREES
T OFFSET: 421 MS
VENTRICULAR RATE: 80 BPM

## 2024-03-14 LAB
LAB AP ASR DISCLAIMER: NORMAL
LABORATORY COMMENT REPORT: NORMAL
PATH REPORT.FINAL DX SPEC: NORMAL
PATH REPORT.GROSS SPEC: NORMAL
PATH REPORT.TOTAL CANCER: NORMAL

## 2024-03-20 LAB
ELECTRONICALLY SIGNED BY: NORMAL
H. PYLORI DRUG SUSCEPTIBILITY RESULTS: NORMAL

## 2024-03-24 DIAGNOSIS — K29.70 HELICOBACTER POSITIVE GASTRITIS: Primary | ICD-10-CM

## 2024-03-24 DIAGNOSIS — B96.81 HELICOBACTER POSITIVE GASTRITIS: Primary | ICD-10-CM

## 2024-03-24 RX ORDER — AMOXICILLIN 500 MG/1
1000 CAPSULE ORAL 2 TIMES DAILY
Qty: 56 CAPSULE | Refills: 0 | Status: SHIPPED | OUTPATIENT
Start: 2024-03-24 | End: 2024-04-07

## 2024-03-24 RX ORDER — CLARITHROMYCIN 500 MG/1
500 TABLET, FILM COATED ORAL 2 TIMES DAILY
Qty: 28 TABLET | Refills: 0 | Status: SHIPPED | OUTPATIENT
Start: 2024-03-24 | End: 2024-04-07

## 2024-03-25 ENCOUNTER — TELEPHONE (OUTPATIENT)
Dept: GASTROENTEROLOGY | Facility: CLINIC | Age: 69
End: 2024-03-25
Payer: MEDICARE

## 2024-03-25 NOTE — TELEPHONE ENCOUNTER
----- Message from Dany Malagon MD sent at 3/24/2024  1:42 PM EDT -----  Biopsy on upper endoscopy-H. pylori gastritis that needs to be treated with antibiotics along with pantoprazole.  I will send prescription for antibiotics-clarithromycin, ampicillin.  Pantoprazole prescription already sent on 3/6/2024.  Make sure you will take pantoprazole twice daily during treatment with antibiotic instead of once daily.  Routine follow-up with GI

## 2024-04-07 DIAGNOSIS — Z79.4 TYPE 2 DIABETES MELLITUS WITH OTHER SPECIFIED COMPLICATION, WITH LONG-TERM CURRENT USE OF INSULIN (MULTI): ICD-10-CM

## 2024-04-07 DIAGNOSIS — E11.69 TYPE 2 DIABETES MELLITUS WITH OTHER SPECIFIED COMPLICATION, WITH LONG-TERM CURRENT USE OF INSULIN (MULTI): ICD-10-CM

## 2024-04-08 RX ORDER — INSULIN DETEMIR 100 [IU]/ML
50 INJECTION, SOLUTION SUBCUTANEOUS NIGHTLY
Qty: 15 ML | Refills: 2 | Status: SHIPPED | OUTPATIENT
Start: 2024-04-08 | End: 2024-07-07

## 2024-04-13 DIAGNOSIS — E55.9 VITAMIN D DEFICIENCY: ICD-10-CM

## 2024-04-15 RX ORDER — OMEGA-3S/DHA/EPA/FISH OIL/D3 300MG-1000
2000 CAPSULE ORAL DAILY
Qty: 90 TABLET | Refills: 0 | Status: SHIPPED | OUTPATIENT
Start: 2024-04-15

## 2024-05-05 DIAGNOSIS — B96.81 HELICOBACTER POSITIVE GASTRITIS: Primary | ICD-10-CM

## 2024-05-05 DIAGNOSIS — K29.70 HELICOBACTER POSITIVE GASTRITIS: Primary | ICD-10-CM

## 2024-05-05 RX ORDER — CLARITHROMYCIN 500 MG/1
500 TABLET, FILM COATED ORAL 2 TIMES DAILY
Qty: 28 TABLET | Refills: 0 | Status: SHIPPED | OUTPATIENT
Start: 2024-05-05 | End: 2024-05-19

## 2024-05-05 RX ORDER — AMOXICILLIN 500 MG/1
1000 CAPSULE ORAL 2 TIMES DAILY
Qty: 56 CAPSULE | Refills: 0 | Status: SHIPPED | OUTPATIENT
Start: 2024-05-05 | End: 2024-05-19

## 2024-05-06 ENCOUNTER — TELEPHONE (OUTPATIENT)
Dept: GASTROENTEROLOGY | Facility: CLINIC | Age: 69
End: 2024-05-06
Payer: MEDICARE

## 2024-05-06 NOTE — TELEPHONE ENCOUNTER
----- Message from Bernard Ragsdale MA sent at 5/6/2024 10:12 AM EDT -----    ----- Message -----  From: Dany Malagon MD  Sent: 5/5/2024  11:12 AM EDT  To: Bernard Ragsdale MA; Tatum Guerra MA    Biopsy on upper endoscopy-H. pylori gastritis that needs to be treated with antibiotic.  I will send prescription for antibiotics-clarithromycin, amoxicillin.  Please take pantoprazole(you already have pantoprazole) 40 mg twice daily for 2 weeks during treatment with antibiotics.  Please keep follow-up appointment with me in July(already scheduled).  Repeat colonoscopy in September this year due to large polyp which was removed in March.    Tatum/bernard , please inform pt.

## 2024-06-05 DIAGNOSIS — E55.9 VITAMIN D DEFICIENCY: ICD-10-CM

## 2024-06-06 DIAGNOSIS — E11.69 TYPE 2 DIABETES MELLITUS WITH OTHER SPECIFIED COMPLICATION, WITH LONG-TERM CURRENT USE OF INSULIN (MULTI): ICD-10-CM

## 2024-06-06 DIAGNOSIS — Z79.4 TYPE 2 DIABETES MELLITUS WITH OTHER SPECIFIED COMPLICATION, WITH LONG-TERM CURRENT USE OF INSULIN (MULTI): ICD-10-CM

## 2024-06-06 DIAGNOSIS — I10 BENIGN ESSENTIAL HYPERTENSION: ICD-10-CM

## 2024-06-08 RX ORDER — ACETAMINOPHEN 500 MG
TABLET ORAL
Qty: 30 CAPSULE | Refills: 0 | Status: SHIPPED | OUTPATIENT
Start: 2024-06-08 | End: 2024-07-08

## 2024-06-10 RX ORDER — VALSARTAN AND HYDROCHLOROTHIAZIDE 160; 12.5 MG/1; MG/1
1 TABLET, FILM COATED ORAL DAILY
Qty: 30 TABLET | Refills: 0 | Status: SHIPPED | OUTPATIENT
Start: 2024-06-10

## 2024-06-10 RX ORDER — METFORMIN HYDROCHLORIDE 1000 MG/1
1000 TABLET ORAL
Qty: 180 TABLET | Refills: 0 | Status: SHIPPED | OUTPATIENT
Start: 2024-06-10

## 2024-06-13 DIAGNOSIS — E78.00 HYPERCHOLESTEROLEMIA: ICD-10-CM

## 2024-06-13 RX ORDER — ATORVASTATIN CALCIUM 20 MG/1
20 TABLET, FILM COATED ORAL NIGHTLY
Qty: 30 TABLET | Refills: 0 | Status: SHIPPED | OUTPATIENT
Start: 2024-06-13 | End: 2024-09-11

## 2024-06-17 ENCOUNTER — APPOINTMENT (OUTPATIENT)
Dept: PRIMARY CARE | Facility: CLINIC | Age: 69
End: 2024-06-17
Payer: MEDICARE

## 2024-06-17 VITALS
TEMPERATURE: 97.6 F | WEIGHT: 221.6 LBS | OXYGEN SATURATION: 98 % | HEART RATE: 92 BPM | DIASTOLIC BLOOD PRESSURE: 66 MMHG | SYSTOLIC BLOOD PRESSURE: 122 MMHG | BODY MASS INDEX: 30.91 KG/M2

## 2024-06-17 DIAGNOSIS — E11.42 TYPE 2 DIABETES MELLITUS WITH DIABETIC POLYNEUROPATHY, WITH LONG-TERM CURRENT USE OF INSULIN (MULTI): ICD-10-CM

## 2024-06-17 DIAGNOSIS — C61 PROSTATE CANCER (MULTI): ICD-10-CM

## 2024-06-17 DIAGNOSIS — E66.09 CLASS 1 OBESITY DUE TO EXCESS CALORIES WITH SERIOUS COMORBIDITY AND BODY MASS INDEX (BMI) OF 30.0 TO 30.9 IN ADULT: ICD-10-CM

## 2024-06-17 DIAGNOSIS — F12.90 MARIJUANA SMOKER: ICD-10-CM

## 2024-06-17 DIAGNOSIS — K29.00 ACUTE GASTRITIS WITHOUT HEMORRHAGE, UNSPECIFIED GASTRITIS TYPE: ICD-10-CM

## 2024-06-17 DIAGNOSIS — Z86.010 HX OF COLONIC POLYP: ICD-10-CM

## 2024-06-17 DIAGNOSIS — I20.89 EXERTIONAL ANGINA (CMS-HCC): ICD-10-CM

## 2024-06-17 DIAGNOSIS — E78.00 HYPERCHOLESTEREMIA: ICD-10-CM

## 2024-06-17 DIAGNOSIS — Z00.00 ENCOUNTER FOR SUBSEQUENT ANNUAL WELLNESS VISIT (AWV) IN MEDICARE PATIENT: Primary | ICD-10-CM

## 2024-06-17 DIAGNOSIS — I73.9 CLAUDICATION (CMS-HCC): ICD-10-CM

## 2024-06-17 DIAGNOSIS — Z79.4 TYPE 2 DIABETES MELLITUS WITH DIABETIC POLYNEUROPATHY, WITH LONG-TERM CURRENT USE OF INSULIN (MULTI): ICD-10-CM

## 2024-06-17 DIAGNOSIS — I10 BENIGN ESSENTIAL HYPERTENSION: ICD-10-CM

## 2024-06-17 PROCEDURE — 1159F MED LIST DOCD IN RCRD: CPT | Performed by: INTERNAL MEDICINE

## 2024-06-17 PROCEDURE — 1170F FXNL STATUS ASSESSED: CPT | Performed by: INTERNAL MEDICINE

## 2024-06-17 PROCEDURE — 3074F SYST BP LT 130 MM HG: CPT | Performed by: INTERNAL MEDICINE

## 2024-06-17 PROCEDURE — 99406 BEHAV CHNG SMOKING 3-10 MIN: CPT | Performed by: INTERNAL MEDICINE

## 2024-06-17 PROCEDURE — 1160F RVW MEDS BY RX/DR IN RCRD: CPT | Performed by: INTERNAL MEDICINE

## 2024-06-17 PROCEDURE — 1124F ACP DISCUSS-NO DSCNMKR DOCD: CPT | Performed by: INTERNAL MEDICINE

## 2024-06-17 PROCEDURE — 99214 OFFICE O/P EST MOD 30 MIN: CPT | Performed by: INTERNAL MEDICINE

## 2024-06-17 PROCEDURE — 3008F BODY MASS INDEX DOCD: CPT | Performed by: INTERNAL MEDICINE

## 2024-06-17 PROCEDURE — G0439 PPPS, SUBSEQ VISIT: HCPCS | Performed by: INTERNAL MEDICINE

## 2024-06-17 PROCEDURE — 1036F TOBACCO NON-USER: CPT | Performed by: INTERNAL MEDICINE

## 2024-06-17 PROCEDURE — 3078F DIAST BP <80 MM HG: CPT | Performed by: INTERNAL MEDICINE

## 2024-06-17 RX ORDER — PANTOPRAZOLE SODIUM 40 MG/1
40 TABLET, DELAYED RELEASE ORAL
Qty: 90 TABLET | Refills: 0 | Status: SHIPPED | OUTPATIENT
Start: 2024-06-17 | End: 2025-06-17

## 2024-06-17 ASSESSMENT — ENCOUNTER SYMPTOMS
LOSS OF SENSATION IN FEET: 1
DEPRESSION: 0
OCCASIONAL FEELINGS OF UNSTEADINESS: 1

## 2024-06-17 ASSESSMENT — PATIENT HEALTH QUESTIONNAIRE - PHQ9
2. FEELING DOWN, DEPRESSED OR HOPELESS: NOT AT ALL
1. LITTLE INTEREST OR PLEASURE IN DOING THINGS: NOT AT ALL
SUM OF ALL RESPONSES TO PHQ9 QUESTIONS 1 AND 2: 0

## 2024-06-17 ASSESSMENT — ACTIVITIES OF DAILY LIVING (ADL)
GROCERY_SHOPPING: NEEDS ASSISTANCE
DRESSING: INDEPENDENT
DOING_HOUSEWORK: INDEPENDENT
TAKING_MEDICATION: INDEPENDENT
MANAGING_FINANCES: INDEPENDENT
BATHING: INDEPENDENT

## 2024-06-18 NOTE — PROGRESS NOTES
Subjective   Reason for Visit: Tyshawn Wilkerson is an 68 y.o. male here for a Medicare Wellness visit / follow up    Past Medical, Surgical, and Family History reviewed and updated in chart.    Reviewed all medications by prescribing practitioner or clinical pharmacist (such as prescriptions, OTCs, herbal therapies and supplements) and documented in the medical record.    HPI    Medicare wellness    Follow up    Diarrhea using pepto bismol  H/o colon polyps  Colonoscopy large 20 mm polyp   recheck 6 months / 7-24 appt  EGD inflammation on protonix     Cold feet x chronic  Claudication sx  Vascular consult     back pain predominantly right-sided with right-sided hip pain better  Fed up with pharmacy  stopped med     DM  this a.m. on insulin better no side effects  Follow up optho  HBA1C 6.3  12-23  nephropathy     Hypercholesterolemia on therapy no side effects     Hypertension stable on therapy no side effects     Prostate cancer  Didn't see urology     Smokes marijuana drinks beer  smoking and alcohol  cessation discussed      Diet and exercise reviewed    Patient Care Team:  Jessica Moralez MD as PCP - General  Jessica Moralez MD as PCP - [employer] PCP     Review of Systems   All other systems reviewed and are negative.      Objective   Vitals:  /66   Pulse 92   Temp 36.4 °C (97.6 °F)   Wt 101 kg (221 lb 9.6 oz)   SpO2 98%   BMI 30.91 kg/m²       Physical Exam  Vitals reviewed.   Constitutional:       Appearance: Normal appearance. He is obese.   HENT:      Head: Normocephalic and atraumatic.      Mouth/Throat:      Pharynx: No posterior oropharyngeal erythema.   Eyes:      General: No scleral icterus.     Conjunctiva/sclera: Conjunctivae normal.      Pupils: Pupils are equal, round, and reactive to light.   Cardiovascular:      Rate and Rhythm: Normal rate and regular rhythm.      Heart sounds: Normal heart sounds.   Pulmonary:      Effort: No respiratory distress.       Breath sounds: No wheezing.   Abdominal:      General: Abdomen is flat. Bowel sounds are normal. There is no distension.      Palpations: Abdomen is soft. There is no mass.      Tenderness: There is no abdominal tenderness. There is no rebound.   Musculoskeletal:         General: Normal range of motion.      Cervical back: Normal range of motion and neck supple.   Skin:     General: Skin is warm and dry.   Neurological:      General: No focal deficit present.      Mental Status: He is alert and oriented to person, place, and time. Mental status is at baseline.   Psychiatric:         Mood and Affect: Mood normal.         Behavior: Behavior normal.         Thought Content: Thought content normal.         Judgment: Judgment normal.         Assessment/Plan   Problem List Items Addressed This Visit       Diabetes with neurologic complications (Multi)    Exertional angina (CMS-[facility])    Prostate cancer (Multi)    Obesity     Other Visit Diagnoses       Encounter for subsequent annual wellness visit (AWV) in Medicare patient    -  Primary    Hypercholesteremia        Benign essential hypertension        Claudication (CMS-[facility])        Relevant Orders    Referral to Vascular Medicine    Hx of colonic polyp        Acute gastritis without hemorrhage, unspecified gastritis type        Relevant Medications    pantoprazole (ProtoNix) 40 mg EC tablet    Marijuana smoker              Medicare wellness    Follow up    Diarrhea using pepto bismol  H/o colon polyps  Colonoscopy large 20 mm polyp   recheck 6 months / 7-24 appt  EGD inflammation on protonix     Cold feet x chronic  Claudication sx  Vascular consult     back pain predominantly right-sided with right-sided hip pain better  Fed up with pharmacy  stopped med     DM  this a.m. on insulin no side effects  Had lowered dose when told was on too much  Ate ice cream last pm  Follow up optho  HBA1C 6.3  12-23  nephropathy     Hypercholesterolemia on therapy no side effects      Hypertension stable on therapy no side effects     Prostate cancer  Didn't see urology     Smokes marijuana drinks beer  smoking and alcohol  cessation discussed   I spent more  than 3 min but less than 10 min counselling pt about need for smoking / tobacco cessation  and how I can support efforts when pt is ready to quit      Diet and exercise reviewed    Prostate 9-23  Colonoscopy 3-24  recheck 9-24  CT chest lung cancer screening n/a  immunizations rev'd shingrix, pneumo, RSV  BMI 30.9    Follow up 3 months

## 2024-06-26 DIAGNOSIS — E78.00 HYPERCHOLESTEROLEMIA: ICD-10-CM

## 2024-06-26 DIAGNOSIS — I10 BENIGN ESSENTIAL HYPERTENSION: ICD-10-CM

## 2024-06-26 RX ORDER — VALSARTAN AND HYDROCHLOROTHIAZIDE 160; 12.5 MG/1; MG/1
1 TABLET, FILM COATED ORAL DAILY
Qty: 90 TABLET | Refills: 0 | Status: SHIPPED | OUTPATIENT
Start: 2024-06-26

## 2024-06-26 RX ORDER — ATORVASTATIN CALCIUM 20 MG/1
20 TABLET, FILM COATED ORAL NIGHTLY
Qty: 90 TABLET | Refills: 0 | Status: SHIPPED | OUTPATIENT
Start: 2024-06-26 | End: 2024-09-24

## 2024-07-02 DIAGNOSIS — E55.9 VITAMIN D DEFICIENCY: ICD-10-CM

## 2024-07-02 RX ORDER — ACETAMINOPHEN 500 MG
TABLET ORAL
Qty: 90 CAPSULE | Refills: 0 | Status: SHIPPED | OUTPATIENT
Start: 2024-07-02 | End: 2024-08-01

## 2024-07-12 DIAGNOSIS — Z79.4 TYPE 2 DIABETES MELLITUS WITH OTHER SPECIFIED COMPLICATION, WITH LONG-TERM CURRENT USE OF INSULIN (MULTI): ICD-10-CM

## 2024-07-12 DIAGNOSIS — E11.69 TYPE 2 DIABETES MELLITUS WITH OTHER SPECIFIED COMPLICATION, WITH LONG-TERM CURRENT USE OF INSULIN (MULTI): ICD-10-CM

## 2024-07-14 DIAGNOSIS — E11.69 TYPE 2 DIABETES MELLITUS WITH OTHER SPECIFIED COMPLICATION, WITH LONG-TERM CURRENT USE OF INSULIN (MULTI): ICD-10-CM

## 2024-07-14 DIAGNOSIS — Z79.4 TYPE 2 DIABETES MELLITUS WITH OTHER SPECIFIED COMPLICATION, WITH LONG-TERM CURRENT USE OF INSULIN (MULTI): ICD-10-CM

## 2024-07-14 RX ORDER — METFORMIN HYDROCHLORIDE 1000 MG/1
1000 TABLET ORAL
Qty: 180 TABLET | Refills: 0 | Status: SHIPPED | OUTPATIENT
Start: 2024-07-14

## 2024-07-14 RX ORDER — INSULIN DETEMIR 100 [IU]/ML
50 INJECTION, SOLUTION SUBCUTANEOUS NIGHTLY
Qty: 10 ML | Refills: 2 | Status: SHIPPED | OUTPATIENT
Start: 2024-07-14 | End: 2024-10-12

## 2024-07-14 RX ORDER — INSULIN DETEMIR 100 [IU]/ML
50 INJECTION, SOLUTION SUBCUTANEOUS NIGHTLY
Qty: 15 ML | Refills: 2 | Status: SHIPPED | OUTPATIENT
Start: 2024-07-14 | End: 2024-10-12

## 2024-07-25 ENCOUNTER — TELEPHONE (OUTPATIENT)
Dept: GASTROENTEROLOGY | Facility: CLINIC | Age: 69
End: 2024-07-25

## 2024-07-25 ENCOUNTER — APPOINTMENT (OUTPATIENT)
Dept: GASTROENTEROLOGY | Facility: CLINIC | Age: 69
End: 2024-07-25
Payer: MEDICARE

## 2024-07-25 VITALS
BODY MASS INDEX: 31.7 KG/M2 | DIASTOLIC BLOOD PRESSURE: 85 MMHG | SYSTOLIC BLOOD PRESSURE: 171 MMHG | WEIGHT: 227.3 LBS | HEART RATE: 81 BPM | OXYGEN SATURATION: 97 %

## 2024-07-25 DIAGNOSIS — B96.81 HELICOBACTER POSITIVE GASTRITIS: Primary | ICD-10-CM

## 2024-07-25 DIAGNOSIS — K29.70 HELICOBACTER POSITIVE GASTRITIS: Primary | ICD-10-CM

## 2024-07-25 DIAGNOSIS — D12.6 TUBULAR ADENOMA OF COLON: ICD-10-CM

## 2024-07-25 PROCEDURE — 1036F TOBACCO NON-USER: CPT | Performed by: INTERNAL MEDICINE

## 2024-07-25 PROCEDURE — 99214 OFFICE O/P EST MOD 30 MIN: CPT | Performed by: INTERNAL MEDICINE

## 2024-07-25 PROCEDURE — 3079F DIAST BP 80-89 MM HG: CPT | Performed by: INTERNAL MEDICINE

## 2024-07-25 PROCEDURE — 3077F SYST BP >= 140 MM HG: CPT | Performed by: INTERNAL MEDICINE

## 2024-07-25 PROCEDURE — 1159F MED LIST DOCD IN RCRD: CPT | Performed by: INTERNAL MEDICINE

## 2024-07-25 RX ORDER — POLYETHYLENE GLYCOL 3350, SODIUM SULFATE ANHYDROUS, SODIUM BICARBONATE, SODIUM CHLORIDE, POTASSIUM CHLORIDE 236; 22.74; 6.74; 5.86; 2.97 G/4L; G/4L; G/4L; G/4L; G/4L
4000 POWDER, FOR SOLUTION ORAL ONCE
Qty: 4000 ML | Refills: 0 | Status: SHIPPED | OUTPATIENT
Start: 2024-07-25 | End: 2024-07-25

## 2024-07-25 ASSESSMENT — ENCOUNTER SYMPTOMS: GASTROINTESTINAL NEGATIVE: 1

## 2024-07-25 NOTE — PATIENT INSTRUCTIONS
Hold pantoprazole then check stool H. pylori antigen.  Surveillance EGD in 2 years.  Colonoscopy in September 2024.  High-fiber low-fat diet.  Weight loss, exercise as tolerated.  Follow-up with me in 6 months

## 2024-07-25 NOTE — PROGRESS NOTES
Chief Complaint: Tyshawn Wilkerson is a 68 y.o. male who presents for Follow-up.  HPI  68-year-old gentleman, patient of Shauna Xavier, history of hypertension, hyperlipidemia, PVD, diabetes, obesity, CA prostate, CKD came to GI clinic for follow-up after EGD, colonoscopy.  Epigastric pain improved with pantoprazole.  EGD, colonoscopy on 3/6/2024, EGD-irregular Z-line, moderate gastritis.  Biopsy-focal Stanton's, H. pylori gastritis.  Treated with antibiotics.  Colonoscopy-20 mm cecal polyp removed with EMR, 2 clips were placed, sigmoid diverticulosis, grade 1 internal hemorrhoids.  Biopsy-tubular adenoma with high-grade dysplasia.  Random colon biopsy-no evidence of ischemic colitis.  CBC, LFTs within normal limit.  BUNs/creatinine 24/1.42 checked in December 2023.  CT scan on 11/16/2020 at [facility]: 2.3 cm indeterminate right lobe hepatic mass.  Question of another additional smaller hypodensity in the left lobe   MRI on 12/18/2020-benign lesion likely atypical hemangioma  Review of Systems   Gastrointestinal: Negative.      12 Point ROS negative outside of symptoms stated above in HPI    Past Medical History:   Diagnosis Date    Anxiety     Chronic low back pain     CKD (chronic kidney disease), stage III (Multi)     Class 1 obesity with body mass index (BMI) of 31.0 to 31.9 in adult 02/07/2024    Colon cancer screening 03/06/2024    Depression     DM (diabetes mellitus) (Multi)     Hx poor control    Elevated PSA     Epigastric pain     Exertional angina (CMS-HCC) 2018    Per hx    HLD (hyperlipidemia)     HTN (hypertension)     Hx of colonic polyp 2018    Other conditions influencing health status     Screening for malignant neoplasms, colon    Pain in left shoulder     Pain in joint of left shoulder    Personal history of diseases of the skin and subcutaneous tissue     History of abscess of skin and subcutaneous tissue    Personal history of other specified conditions     History of diarrhea    Prostate  "cancer (Multi)     [facility]       Past Surgical History:   Procedure Laterality Date    KNEE SURGERY  07/17/2013    Knee Surgery    OTHER SURGICAL HISTORY  07/17/2013    Wrist Carpectomy    OTHER SURGICAL HISTORY  07/17/2013    Shoulder Surgery Left    OTHER SURGICAL HISTORY  07/28/2021    Prostate surgery    OTHER SURGICAL HISTORY  07/28/2021    Colonoscopy (Fiberoptic) Sigmoid Colon    TOTAL KNEE ARTHROPLASTY Right        No relevant family history has been documented for this patient.     reports that he has never smoked. He has never been exposed to tobacco smoke. He has never used smokeless tobacco. He reports current alcohol use. He reports current drug use. Drug: Marijuana.    No Known Allergies    Imaging  No results found.      Laboratory  No results found for this or any previous visit (from the past 96 hour(s)).     Objective       Current Outpatient Medications:     Accu-Chek Rosie Plus test strp strip, use 1 TEST STRIP to TEST BLOOD SUGAR four times a day, Disp: 100 strip, Rfl: 11    acetaminophen (Tylenol 8 HOUR) 650 mg ER tablet, Take 1 tablet (650 mg) by mouth once daily. Do not crush, chew, or split., Disp: , Rfl:     atorvastatin (Lipitor) 20 mg tablet, TAKE 1 TABLET (20 MG) BY MOUTH ONCE DAILY AT BEDTIME., Disp: 90 tablet, Rfl: 0    BD Insulin Syringe, half unit, 0.3 mL 31 gauge x 5/16\" syringe, inject 30 units OF LEVEMIR daily and 6 to 12 units OF HUMALOG upto 3 times  daily, Disp: 400 each, Rfl: 3    cholecalciferol (Vitamin D-3) 50 mcg (2,000 unit) capsule, TAKE 1 TABLET (2,000 UNITS) BY MOUTH ONCE DAILY., Disp: 90 capsule, Rfl: 0    HumaLOG U-100 Insulin 100 unit/mL injection, Inject under the skin 3 times a day with meals. Inject 10-20 units per sliding scale three times daily., Disp: 10 mL, Rfl: 2    insulin syringe-needle U-100 1 mL 28 gauge syringe, Inject 1 Syringe under the skin once daily at bedtime. Inject 50 units subcutaneous once daily at bedtime.  Take as directed per insulin " "instructions., Disp: 100 each, Rfl: 3    insulin syringe-needle U-100 1 mL 28 gauge x 1/2\" syringe, PLEASE SEE ATTACHED FOR DETAILED DIRECTIONS, Disp: , Rfl:     Levemir U-100 Insulin 100 unit/mL injection, Inject 50 Units under the skin once daily at bedtime. Take as directed per insulin instructions., Disp: 15 mL, Rfl: 2    Levemir U-100 Insulin 100 unit/mL injection, INJECT 50 UNITS UNDER THE SKIN ONCE DAILY AT BEDTIME. TAKE AS DIRECTED PER INSULIN INSTRUCTIONS., Disp: 10 mL, Rfl: 2    metFORMIN (Glucophage) 1,000 mg tablet, Take 1 tablet (1,000 mg) by mouth 2 times daily (morning and late afternoon)., Disp: 180 tablet, Rfl: 0    pantoprazole (ProtoNix) 40 mg EC tablet, Take 1 tablet (40 mg) by mouth once daily in the morning. Take before meals. Do not crush, chew, or split., Disp: 90 tablet, Rfl: 0    valsartan-hydrochlorothiazide (Diovan-HCT) 160-12.5 mg tablet, TAKE 1 TABLET BY MOUTH EVERY DAY, Disp: 90 tablet, Rfl: 0    acetaminophen (Tylenol) 325 mg suppository, Insert into the rectum., Disp: , Rfl:     Last Recorded Vitals  Blood pressure 171/85, pulse 81, weight 103 kg (227 lb 4.8 oz), SpO2 97%.    Physical Exam  HENT:      Mouth/Throat:      Mouth: Mucous membranes are moist.   Eyes:      General: No scleral icterus.  Cardiovascular:      Rate and Rhythm: Normal rate and regular rhythm.   Pulmonary:      Effort: Pulmonary effort is normal. No respiratory distress.      Breath sounds: Normal breath sounds.   Abdominal:      General: Abdomen is flat. Bowel sounds are normal.      Palpations: Abdomen is soft.      Comments: Abdominal obesity, limited exam   Musculoskeletal:      Right lower leg: No edema.      Left lower leg: No edema.   Neurological:      Mental Status: He is alert and oriented to person, place, and time.         Assessment/Plan    Epigastric pain-improved with pantoprazole  H. pylori gastritis S/P treatment with antibiotics  Focal Stanton's  Large tubular adenoma with high-grade " dysplasia  Grade 1 internal hemorrhoids  Atypical hemangioma in the liver      Hold pantoprazole X 2 wks then check stool H. pylori antigen.  Surveillance EGD in 2 years.  Colonoscopy in September 2024.  High-fiber low-fat diet.  Weight loss, exercise as tolerated.  Follow-up with me in 6 months

## 2024-07-25 NOTE — TELEPHONE ENCOUNTER
----- Message from Jatinder Akins sent at 7/25/2024  9:53 AM EDT -----  Regarding: Colonoscopy Scheduling  Hi!  Can you schedule Tyshawn for a colonoscopy for some time in September? Dr. Malagon already put the order for the prep in as well.  Thank you!

## 2024-08-15 ENCOUNTER — LAB (OUTPATIENT)
Dept: LAB | Facility: LAB | Age: 69
End: 2024-08-15
Payer: MEDICARE

## 2024-08-15 DIAGNOSIS — B96.81 HELICOBACTER POSITIVE GASTRITIS: ICD-10-CM

## 2024-08-15 DIAGNOSIS — K29.70 HELICOBACTER POSITIVE GASTRITIS: ICD-10-CM

## 2024-08-15 PROCEDURE — 87449 NOS EACH ORGANISM AG IA: CPT

## 2024-08-17 LAB — H PYLORI AG STL QL IA: NEGATIVE

## 2024-08-30 ENCOUNTER — TELEPHONE (OUTPATIENT)
Dept: PREADMISSION TESTING | Facility: HOSPITAL | Age: 69
End: 2024-08-30
Payer: MEDICARE

## 2024-08-30 NOTE — TELEPHONE ENCOUNTER
SURGERY PRE-OPERATIVE INSTRUCTIONS    *You will receive a phone call the day before your procedure  after 2pm, (or the Friday before your surgery if scheduled on a Monday.) Generally the hospital will be calling you with this information after that time.    *You are not to eat after midnight the night before the surgery. You may have 8oz of a clear liquid up until 2 hours prior to arriving to the hospital. The exception is with medications you were instructed to take day of surgery.    *You may take tylenol for pain/discomfort as needed.     *Stop taking all aspirin products, ibuprofen (motrin/advil), naproxen (aleve/naprosyn) for one week prior to surgery.    *Stop taking all vitamins and supplements one week prior to surgery.     *You should not have alcoholic beverages for 24 hours before surgery.     *You should not smoke 24 hours prior to surgery.     *To help prevent surgical infections bathe/shower with Dial soap the evening before surgery.    *You can wear deodorant but no lotion, powder, or perfume/cologne. You should remove all make-up and nail polish at home.    *If you wear glasses, please bring a case for the glasses with you.    *You will be asked to remove dentures and contacts.     *Please leave all valuables at home.    *You should wear loose, comfortable clothing that will accommodate bandages and/or casts.    *You should notify your doctor of any change in your condition (fever, cold, rash, etc). Surgery may need to be re-scheduled until a time you are in better health.    *A responsible adult is required to accompany you to and from the hospital if you are receiving anesthesia or a sedative. Patients are not permitted to drive for 24 hours after anesthesia.     *You can use the [employer] parking if you wish.     *If you have any further questions please call [phone]. Pt not to take insulin, metformin or valsartan am of procedure.  LT

## 2024-09-03 ENCOUNTER — ANESTHESIA EVENT (OUTPATIENT)
Dept: OPERATING ROOM | Facility: HOSPITAL | Age: 69
End: 2024-09-03
Payer: MEDICARE

## 2024-09-04 ENCOUNTER — HOSPITAL ENCOUNTER (OUTPATIENT)
Dept: OPERATING ROOM | Facility: HOSPITAL | Age: 69
Setting detail: OUTPATIENT SURGERY
Discharge: HOME | End: 2024-09-04
Payer: MEDICARE

## 2024-09-04 ENCOUNTER — ANESTHESIA (OUTPATIENT)
Dept: OPERATING ROOM | Facility: HOSPITAL | Age: 69
End: 2024-09-04
Payer: MEDICARE

## 2024-09-04 VITALS
DIASTOLIC BLOOD PRESSURE: 65 MMHG | SYSTOLIC BLOOD PRESSURE: 104 MMHG | HEART RATE: 65 BPM | TEMPERATURE: 96.8 F | OXYGEN SATURATION: 98 % | RESPIRATION RATE: 16 BRPM | WEIGHT: 222.66 LBS | HEIGHT: 70 IN | BODY MASS INDEX: 31.88 KG/M2

## 2024-09-04 DIAGNOSIS — D12.6 TUBULAR ADENOMA OF COLON: ICD-10-CM

## 2024-09-04 PROBLEM — E11.49 TYPE 2 DIABETES MELLITUS WITH NEUROLOGIC COMPLICATION (MULTI): Status: ACTIVE | Noted: 2024-09-04

## 2024-09-04 PROBLEM — N18.30 CHRONIC RENAL IMPAIRMENT, STAGE 3 (MODERATE) (MULTI): Status: ACTIVE | Noted: 2024-09-04

## 2024-09-04 LAB
GLUCOSE BLD MANUAL STRIP-MCNC: 179 MG/DL (ref 74–99)
GLUCOSE BLD MANUAL STRIP-MCNC: 180 MG/DL (ref 74–99)

## 2024-09-04 PROCEDURE — 3600000002 HC OR TIME - INITIAL BASE CHARGE - PROCEDURE LEVEL TWO: Performed by: ANESTHESIOLOGY

## 2024-09-04 PROCEDURE — 45385 COLONOSCOPY W/LESION REMOVAL: CPT | Performed by: INTERNAL MEDICINE

## 2024-09-04 PROCEDURE — 45381 COLONOSCOPY SUBMUCOUS NJX: CPT | Performed by: INTERNAL MEDICINE

## 2024-09-04 PROCEDURE — 82947 ASSAY GLUCOSE BLOOD QUANT: CPT

## 2024-09-04 PROCEDURE — 2500000004 HC RX 250 GENERAL PHARMACY W/ HCPCS (ALT 636 FOR OP/ED): Performed by: REGISTERED NURSE

## 2024-09-04 PROCEDURE — 2500000004 HC RX 250 GENERAL PHARMACY W/ HCPCS (ALT 636 FOR OP/ED): Performed by: ANESTHESIOLOGY

## 2024-09-04 PROCEDURE — 3700000001 HC GENERAL ANESTHESIA TIME - INITIAL BASE CHARGE: Performed by: ANESTHESIOLOGY

## 2024-09-04 PROCEDURE — 45380 COLONOSCOPY AND BIOPSY: CPT | Performed by: INTERNAL MEDICINE

## 2024-09-04 PROCEDURE — 7100000009 HC PHASE TWO TIME - INITIAL BASE CHARGE: Performed by: ANESTHESIOLOGY

## 2024-09-04 PROCEDURE — 3600000007 HC OR TIME - EACH INCREMENTAL 1 MINUTE - PROCEDURE LEVEL TWO: Performed by: ANESTHESIOLOGY

## 2024-09-04 PROCEDURE — 3700000002 HC GENERAL ANESTHESIA TIME - EACH INCREMENTAL 1 MINUTE: Performed by: ANESTHESIOLOGY

## 2024-09-04 PROCEDURE — 7100000010 HC PHASE TWO TIME - EACH INCREMENTAL 1 MINUTE: Performed by: ANESTHESIOLOGY

## 2024-09-04 PROCEDURE — 2720000007 HC OR 272 NO HCPCS: Performed by: ANESTHESIOLOGY

## 2024-09-04 RX ORDER — PROPOFOL 10 MG/ML
INJECTION, EMULSION INTRAVENOUS AS NEEDED
Status: DISCONTINUED | OUTPATIENT
Start: 2024-09-04 | End: 2024-09-04

## 2024-09-04 RX ORDER — SODIUM CHLORIDE, SODIUM LACTATE, POTASSIUM CHLORIDE, CALCIUM CHLORIDE 600; 310; 30; 20 MG/100ML; MG/100ML; MG/100ML; MG/100ML
20 INJECTION, SOLUTION INTRAVENOUS CONTINUOUS
Status: DISCONTINUED | OUTPATIENT
Start: 2024-09-04 | End: 2024-09-05 | Stop reason: HOSPADM

## 2024-09-04 RX ORDER — ALBUTEROL SULFATE 0.83 MG/ML
2.5 SOLUTION RESPIRATORY (INHALATION) ONCE AS NEEDED
Status: DISCONTINUED | OUTPATIENT
Start: 2024-09-04 | End: 2024-09-05 | Stop reason: HOSPADM

## 2024-09-04 RX ORDER — MEPERIDINE HYDROCHLORIDE 25 MG/ML
12.5 INJECTION INTRAMUSCULAR; INTRAVENOUS; SUBCUTANEOUS EVERY 10 MIN PRN
Status: DISCONTINUED | OUTPATIENT
Start: 2024-09-04 | End: 2024-09-05 | Stop reason: HOSPADM

## 2024-09-04 RX ORDER — ONDANSETRON HYDROCHLORIDE 2 MG/ML
4 INJECTION, SOLUTION INTRAVENOUS ONCE AS NEEDED
Status: DISCONTINUED | OUTPATIENT
Start: 2024-09-04 | End: 2024-09-05 | Stop reason: HOSPADM

## 2024-09-04 RX ORDER — OXYCODONE HYDROCHLORIDE 5 MG/1
5 TABLET ORAL EVERY 4 HOURS PRN
Status: DISCONTINUED | OUTPATIENT
Start: 2024-09-04 | End: 2024-09-05 | Stop reason: HOSPADM

## 2024-09-04 RX ORDER — DROPERIDOL 2.5 MG/ML
0.62 INJECTION, SOLUTION INTRAMUSCULAR; INTRAVENOUS ONCE AS NEEDED
Status: DISCONTINUED | OUTPATIENT
Start: 2024-09-04 | End: 2024-09-05 | Stop reason: HOSPADM

## 2024-09-04 RX ORDER — MIDAZOLAM HYDROCHLORIDE 1 MG/ML
INJECTION INTRAMUSCULAR; INTRAVENOUS AS NEEDED
Status: DISCONTINUED | OUTPATIENT
Start: 2024-09-04 | End: 2024-09-04

## 2024-09-04 RX ORDER — SODIUM CHLORIDE, SODIUM LACTATE, POTASSIUM CHLORIDE, CALCIUM CHLORIDE 600; 310; 30; 20 MG/100ML; MG/100ML; MG/100ML; MG/100ML
100 INJECTION, SOLUTION INTRAVENOUS CONTINUOUS
Status: DISCONTINUED | OUTPATIENT
Start: 2024-09-04 | End: 2024-09-05 | Stop reason: HOSPADM

## 2024-09-04 RX ORDER — DIPHENHYDRAMINE HYDROCHLORIDE 50 MG/ML
12.5 INJECTION INTRAMUSCULAR; INTRAVENOUS ONCE AS NEEDED
Status: DISCONTINUED | OUTPATIENT
Start: 2024-09-04 | End: 2024-09-05 | Stop reason: HOSPADM

## 2024-09-04 SDOH — HEALTH STABILITY: MENTAL HEALTH: CURRENT SMOKER: 1

## 2024-09-04 ASSESSMENT — COLUMBIA-SUICIDE SEVERITY RATING SCALE - C-SSRS
6. HAVE YOU EVER DONE ANYTHING, STARTED TO DO ANYTHING, OR PREPARED TO DO ANYTHING TO END YOUR LIFE?: NO
1. IN THE PAST MONTH, HAVE YOU WISHED YOU WERE DEAD OR WISHED YOU COULD GO TO SLEEP AND NOT WAKE UP?: NO
2. HAVE YOU ACTUALLY HAD ANY THOUGHTS OF KILLING YOURSELF?: NO

## 2024-09-04 ASSESSMENT — PAIN - FUNCTIONAL ASSESSMENT
PAIN_FUNCTIONAL_ASSESSMENT: 0-10
PAIN_FUNCTIONAL_ASSESSMENT: 0-10

## 2024-09-04 ASSESSMENT — PAIN SCALES - GENERAL
PAINLEVEL_OUTOF10: 0 - NO PAIN
PAINLEVEL_OUTOF10: 0 - NO PAIN
PAIN_LEVEL: 2

## 2024-09-04 NOTE — ANESTHESIA PREPROCEDURE EVALUATION
Patient: Tyshawn Wilkerson    Procedure Information       Date/Time: 09/04/24 0800    Scheduled providers: Dany Malagon MD; Miki Aldana MD    Procedure: COLONOSCOPY    Location: [facility] OR            Relevant Problems   Anesthesia (within normal limits)      Cardiac   (+) Essential hypertension   (+) Exertional angina (CMS-HCC)   (+) Hyperlipidemia   (+) Peripheral vascular disease (CMS-HCC)      Pulmonary (within normal limits)      Neuro   (+) Depression with anxiety   (+) Depression, recurrent (CMS-HCC)      /Renal   (+) Chronic renal impairment, stage 3 (moderate) (Multi)   (+) Prostate cancer (Multi)   (+) Urinary tract infection      Endocrine   (+) History of uncontrolled diabetes   (+) Obesity   (+) Type 2 diabetes mellitus with neurologic complication (Multi)      Musculoskeletal   (+) DJD (degenerative joint disease) of knee   (+) Osteoarthritis      ID   (+) Disease due to severe acute respiratory syndrome coronavirus 2 (SARS-CoV-2)   (+) Urinary tract infection      Nervous   (+) Diabetes with neurologic complications (Multi)   (+) Insomnia      Genitourinary   (+) Stage 3a chronic kidney disease (Multi)      Musculoskeletal and Injuries   (+) Chronic back pain      Mental Health   (+) Alcohol abuse       Clinical information reviewed:    Allergies  Meds  Problems              NPO Detail:  NPO/Void Status  Date of Last Liquid: 09/03/24  Date of Last Solid: 09/03/24         Physical Exam    Airway  Mallampati: II  TM distance: >3 FB  Neck ROM: full     Cardiovascular - normal exam     Dental - normal exam  Comments: Some broken teeth     Pulmonary - normal exam     Abdominal   (+) obese           Anesthesia Plan    History of general anesthesia?: yes  History of complications of general anesthesia?: no    ASA 3     MAC     The patient is a current smoker.    intravenous induction   Anesthetic plan and risks discussed with patient.    Plan discussed with attending and  CRNA.

## 2024-09-04 NOTE — DISCHARGE INSTRUCTIONS
Patient Instructions after a Colonoscopy      The anesthetics, sedatives or narcotics which were given to you today will be acting in your body for the next 24 hours, so you might feel a little sleepy or groggy.  This feeling should slowly wear off. Carefully read and follow the instructions.     You received sedation today:  - Do not drive or operate any machinery or power tools of any kind.   - No alcoholic beverages today, not even beer or wine.  - Do not make any important decisions or sign any legal documents.  - No over the counter medications that contain alcohol or that may cause drowsiness.  - Do not make any important decisions or sign any legal documents.  - Make sure you have someone with you for first 24 hours.    While it is common to experience mild to moderate abdominal distention, gas, or belching after your procedure, if any of these symptoms occur following discharge from the GI Lab or within one week of having your procedure, call the [facility] to be advised whether a visit to your nearest Urgent Care or Emergency Department is indicated.  Take this paper with you if you go.     - If you develop an allergic reaction to the medications that were given during your procedure such as difficulty breathing, rash, hives, severe nausea, vomiting or lightheadedness.  - If you experience chest pain, shortness of breath, severe abdominal pain, fevers and chills.  -If you develop signs and symptoms of bleeding such as blood in your spit, if your stools turn black, tarry, or bloody  - If you have not urinated within 8 hours following your procedure.  - If your IV site becomes painful, red, inflamed, or looks infected.    If you received a biopsy/polypectomy/sphincterotomy the following instructions apply below:    __ Do not use Aspirin containing products, non-steroidal medications or anti-coagulants for one week following your procedure. (Examples of these types of medications are: Advil,  Arthrotec, Aleve, Coumadin, Ecotrin, Heparin, Ibuprofen, Indocin, Motrin, Naprosyn, Nuprin, Plavix, Vioxx, and Voltarin, or their generic forms.  This list is not all-inclusive.  Check with your physician or pharmacist before resuming medications.)   __ Eat a soft diet today.  Avoid foods that are poorly digested for the next 24 hours.  These foods would include: nuts, beans, lettuce, red meats, and fried foods. Start with liquids and advance your diet as tolerated, gradually work up to eating solids.   __ Do not have a Barium Study or Enema for one week.    Your physician recommends the additional following instructions:    -You have a contact number available for emergencies. The signs and symptoms of potential delayed complications were discussed with you. You may return to normal activities tomorrow.  -Resume your previous diet.  -Continue your present medications.   -We are waiting for your pathology results.  -Your physician has recommended a repeat colonoscopy (date to be determined after pending pathology results are reviewed) for surveillance based on pathology results.  -The findings and recommendations have been discussed with you.  -The findings and recommendations were discussed with your family.  - Please see Medication Reconciliation Form for new medication/medications prescribed.       If you experience any problems or have any questions following discharge from the GI Lab, please call:        Nurse Signature                                                                        Date___________________                                                                            Patient/Responsible Party Signature                                        Date___________________

## 2024-09-04 NOTE — H&P
History Of Present Illness  Tyshawn Wilkerson is a 69 y.o. male presenting to GI lab for surveillance colonoscopy     Past Medical History  Past Medical History:   Diagnosis Date    Anxiety     Chronic low back pain     CKD (chronic kidney disease), stage III (Multi)     Class 1 obesity with body mass index (BMI) of 31.0 to 31.9 in adult 02/07/2024    Colon cancer screening 03/06/2024    Depression     DM (diabetes mellitus) (Multi)     Hx poor control    Elevated PSA     Epigastric pain     Exertional angina (CMS-HCC) 2018    Per hx    HLD (hyperlipidemia)     HTN (hypertension)     Hx of colonic polyp 2018    Other conditions influencing health status     Screening for malignant neoplasms, colon    Pain in left shoulder     Pain in joint of left shoulder    Personal history of diseases of the skin and subcutaneous tissue     History of abscess of skin and subcutaneous tissue    Personal history of other specified conditions     History of diarrhea    Prostate cancer (Multi)     PSH       Surgical History  Past Surgical History:   Procedure Laterality Date    CARPAL TUNNEL RELEASE Bilateral     KNEE SURGERY  07/17/2013    Knee Surgery    OTHER SURGICAL HISTORY  07/17/2013    Wrist Carpectomy    OTHER SURGICAL HISTORY  07/17/2013    Shoulder Surgery Left    OTHER SURGICAL HISTORY  07/28/2021    Prostate surgery    OTHER SURGICAL HISTORY  07/28/2021    Colonoscopy (Fiberoptic) Sigmoid Colon    TOTAL KNEE ARTHROPLASTY Right         Social History  He reports that he has never smoked. He has never been exposed to tobacco smoke. He has never used smokeless tobacco. He reports current alcohol use. He reports current drug use. Drug: Marijuana.    Family History  Family History   Problem Relation Name Age of Onset    Diabetes Mother      Coronary artery disease Father      Heart attack Brother      Lung cancer Maternal Grandfather      Colon cancer Paternal Grandfather          Allergies  Patient has no known  "allergies.    Review of Systems     Physical Exam  Cardiovascular:      Rate and Rhythm: Normal rate and regular rhythm.   Pulmonary:      Effort: Pulmonary effort is normal. No respiratory distress.   Neurological:      Mental Status: He is alert and oriented to person, place, and time.          Last Recorded Vitals  Blood pressure 141/86, pulse 78, temperature 36.4 °C (97.5 °F), resp. rate 16, height 1.778 m (5' 10\"), weight 101 kg (222 lb 10.6 oz), SpO2 98%.    Relevant Results             Assessment/Plan   Assessment & Plan  Tubular adenoma of colon             Large cecal polyp removed, path report tubular adenoma with high-grade dysplasia.    Surveillance colonoscopy      Dany Malagon MD    "

## 2024-09-04 NOTE — ANESTHESIA POSTPROCEDURE EVALUATION
Patient: Tyshawn Wilkerson    Procedure Summary       Date: 09/04/24 Room / Location: [facility] OR    Anesthesia Start: 0815 Anesthesia Stop: 0855    Procedure: COLONOSCOPY Diagnosis: Tubular adenoma of colon    Scheduled Providers: Dany Malagon MD; Miki Aldana MD Responsible Provider: Miki Aldana MD    Anesthesia Type: MAC ASA Status: 3            Anesthesia Type: MAC    Vitals Value Taken Time   /65 09/04/24 0904   Temp 36 °C (96.8 °F) 09/04/24 0849   Pulse 65 09/04/24 0904   Resp 16 09/04/24 0904   SpO2 98 % 09/04/24 0904       Anesthesia Post Evaluation    Patient location during evaluation: PACU  Patient participation: complete - patient participated  Level of consciousness: awake  Pain score: 2  Pain management: adequate  Multimodal analgesia pain management approach  Airway patency: patent  Two or more strategies used to mitigate risk of obstructive sleep apnea  Cardiovascular status: acceptable  Respiratory status: acceptable  Hydration status: acceptable  Postoperative Nausea and Vomiting: none    There were no known notable events for this encounter.

## 2024-09-06 ENCOUNTER — TELEPHONE (OUTPATIENT)
Dept: GASTROENTEROLOGY | Facility: CLINIC | Age: 69
End: 2024-09-06

## 2024-09-06 NOTE — TELEPHONE ENCOUNTER
----- Message from Dany Malagon sent at 9/2/2024 10:15 AM EDT -----  Stool H. pylori antigen negative that means there is no active infection of H. pylori.    Braden, please inform patient

## 2024-09-11 LAB
LABORATORY COMMENT REPORT: NORMAL
PATH REPORT.FINAL DX SPEC: NORMAL
PATH REPORT.GROSS SPEC: NORMAL
PATH REPORT.RELEVANT HX SPEC: NORMAL
PATH REPORT.TOTAL CANCER: NORMAL

## 2024-09-12 DIAGNOSIS — K29.00 ACUTE GASTRITIS WITHOUT HEMORRHAGE, UNSPECIFIED GASTRITIS TYPE: ICD-10-CM

## 2024-09-12 RX ORDER — PANTOPRAZOLE SODIUM 40 MG/1
40 TABLET, DELAYED RELEASE ORAL
Qty: 30 TABLET | Refills: 0 | Status: SHIPPED | OUTPATIENT
Start: 2024-09-12 | End: 2024-10-12

## 2024-09-18 ENCOUNTER — APPOINTMENT (OUTPATIENT)
Dept: PRIMARY CARE | Facility: CLINIC | Age: 69
End: 2024-09-18
Payer: MEDICARE

## 2024-09-18 VITALS
TEMPERATURE: 96.8 F | HEART RATE: 74 BPM | WEIGHT: 223.4 LBS | BODY MASS INDEX: 32.05 KG/M2 | SYSTOLIC BLOOD PRESSURE: 140 MMHG | DIASTOLIC BLOOD PRESSURE: 82 MMHG | OXYGEN SATURATION: 97 %

## 2024-09-18 DIAGNOSIS — Z00.00 ANNUAL PHYSICAL EXAM: Primary | ICD-10-CM

## 2024-09-18 DIAGNOSIS — F10.10 ALCOHOL ABUSE: ICD-10-CM

## 2024-09-18 DIAGNOSIS — F12.90 MARIJUANA SMOKER: ICD-10-CM

## 2024-09-18 DIAGNOSIS — Z79.4 TYPE 2 DIABETES MELLITUS WITH OTHER SPECIFIED COMPLICATION, WITH LONG-TERM CURRENT USE OF INSULIN: ICD-10-CM

## 2024-09-18 DIAGNOSIS — E11.69 TYPE 2 DIABETES MELLITUS WITH OTHER SPECIFIED COMPLICATION, WITH LONG-TERM CURRENT USE OF INSULIN: ICD-10-CM

## 2024-09-18 DIAGNOSIS — N18.31 STAGE 3A CHRONIC KIDNEY DISEASE (MULTI): ICD-10-CM

## 2024-09-18 DIAGNOSIS — E55.9 VITAMIN D DEFICIENCY: ICD-10-CM

## 2024-09-18 DIAGNOSIS — I10 BENIGN ESSENTIAL HYPERTENSION: ICD-10-CM

## 2024-09-18 DIAGNOSIS — E78.00 HYPERCHOLESTEREMIA: ICD-10-CM

## 2024-09-18 DIAGNOSIS — Z86.010 HX OF COLONIC POLYP: ICD-10-CM

## 2024-09-18 DIAGNOSIS — C61 PROSTATE CANCER (MULTI): ICD-10-CM

## 2024-09-18 DIAGNOSIS — E66.09 CLASS 1 OBESITY DUE TO EXCESS CALORIES WITH SERIOUS COMORBIDITY AND BODY MASS INDEX (BMI) OF 32.0 TO 32.9 IN ADULT: ICD-10-CM

## 2024-09-18 PROCEDURE — 1160F RVW MEDS BY RX/DR IN RCRD: CPT | Performed by: INTERNAL MEDICINE

## 2024-09-18 PROCEDURE — 3077F SYST BP >= 140 MM HG: CPT | Performed by: INTERNAL MEDICINE

## 2024-09-18 PROCEDURE — 1159F MED LIST DOCD IN RCRD: CPT | Performed by: INTERNAL MEDICINE

## 2024-09-18 PROCEDURE — 99214 OFFICE O/P EST MOD 30 MIN: CPT | Performed by: INTERNAL MEDICINE

## 2024-09-18 PROCEDURE — 99397 PER PM REEVAL EST PAT 65+ YR: CPT | Performed by: INTERNAL MEDICINE

## 2024-09-18 PROCEDURE — 3079F DIAST BP 80-89 MM HG: CPT | Performed by: INTERNAL MEDICINE

## 2024-09-18 PROCEDURE — 1036F TOBACCO NON-USER: CPT | Performed by: INTERNAL MEDICINE

## 2024-09-18 RX ORDER — ACETAMINOPHEN 500 MG
TABLET ORAL
COMMUNITY
Start: 2024-07-02

## 2024-09-18 RX ORDER — SYRINGE-NEEDLE,INSULIN,0.5 ML 28 GAUGE
1 SYRINGE, EMPTY DISPOSABLE MISCELLANEOUS NIGHTLY
Qty: 100 EACH | Refills: 3 | Status: SHIPPED | OUTPATIENT
Start: 2024-09-18

## 2024-09-18 NOTE — PROGRESS NOTES
Subjective   Patient ID: Tyshawn Wilkerson is a 69 y.o. male who presents for yearly physical / Results (Colonoscopy, thought they would remove tumor but worried because they did not ), Weight Gain (Since prostate cancer), Diabetes, and Med Refill.  Diabetes    Med Refill      Yearly physical    Prostate 9-23  Colonoscopy 9-24  recheck 25  CT chest lung cancer screening n/a  immunizations rev'd shingrix, pneumo, RSV  BMI 32    Follow up    Diarrhea using pepto bismol with alcohol  H/o colon polyps  Colonoscopy 9-24 small polyps  EGD inflammation on protonix     Cold feet x chronic  Claudication sx  Vascular consult     back pain predominantly right-sided with right-sided hip pain better  Fed up with pharmacy  stopped med     DM  this a.m. on insulin no side effects  Follow up optho  HBA1C 6.3  12-23  nephropathy     Hypercholesterolemia on therapy no side effects     Hypertension stable on therapy no side effects     Prostate cancer  Didn't see urology     smoking and alcohol  cessation discussed      Diet and exercise rev'd    Review of Systems   All other systems reviewed and are negative.      Objective   /82   Pulse 74   Temp 36 °C (96.8 °F)   Wt 101 kg (223 lb 6.4 oz)   SpO2 97%   BMI 32.05 kg/m²   Lab Results   Component Value Date    WBC 5.0 12/11/2023    HGB 15.1 12/11/2023    HCT 44.4 12/11/2023     12/11/2023    CHOL 138 12/11/2023    TRIG 147 12/11/2023    HDL 54.7 12/11/2023    ALT 21 12/11/2023    AST 14 12/11/2023     12/11/2023    K 4.5 12/11/2023     12/11/2023    CREATININE 1.42 (H) 12/11/2023    BUN 24 (H) 12/11/2023    CO2 27 12/11/2023    TSH 2.01 12/11/2023    PSA <0.10 12/11/2023    INR 0.9 08/11/2020    HGBA1C 6.3 (H) 12/11/2023           Physical Exam  Vitals reviewed.   Constitutional:       Appearance: Normal appearance. He is obese.   HENT:      Head: Normocephalic and atraumatic.      Mouth/Throat:      Pharynx: No posterior oropharyngeal erythema.    Eyes:      General: No scleral icterus.     Conjunctiva/sclera: Conjunctivae normal.      Pupils: Pupils are equal, round, and reactive to light.   Cardiovascular:      Rate and Rhythm: Normal rate and regular rhythm.      Heart sounds: Normal heart sounds.   Pulmonary:      Effort: No respiratory distress.      Breath sounds: No wheezing.   Abdominal:      General: Abdomen is flat. Bowel sounds are normal. There is no distension.      Palpations: Abdomen is soft. There is no mass.      Tenderness: There is no abdominal tenderness. There is no rebound.   Musculoskeletal:         General: Normal range of motion.      Cervical back: Normal range of motion and neck supple.   Skin:     General: Skin is warm and dry.   Neurological:      General: No focal deficit present.      Mental Status: He is alert and oriented to person, place, and time. Mental status is at baseline.   Psychiatric:         Mood and Affect: Mood normal.         Behavior: Behavior normal.         Thought Content: Thought content normal.         Judgment: Judgment normal.         Problem List Items Addressed This Visit             ICD-10-CM    Alcohol abuse F10.10    Prostate cancer (Multi) C61    Relevant Orders    PSA, total and free    Stage 3a chronic kidney disease (Multi) N18.31    Relevant Orders    CBC and Auto Differential    Vitamin D deficiency E55.9    Relevant Orders    Vitamin D 25-Hydroxy,Total (for eval of Vitamin D levels)    Obesity E66.9     Other Visit Diagnoses         Codes    Annual physical exam    -  Primary Z00.00    Relevant Orders    Comprehensive Metabolic Panel    Lipid Panel    TSH with reflex to Free T4 if abnormal    Type 2 diabetes mellitus with other specified complication, with long-term current use of insulin (Multi)     E11.69, Z79.4    Relevant Medications    insulin syringe-needle U-100 1 mL 28 gauge syringe    Other Relevant Orders    Hemoglobin A1C    Albumin-Creatinine Ratio, Urine Random    Hx of colonic  polyp     Z86.010    Hypercholesteremia     E78.00    Benign essential hypertension     I10    Marijuana smoker     F12.90          Assessment/Plan     Yearly physical    Prostate 9-23  Colonoscopy 9-24  recheck 25  CT chest lung cancer screening n/a  immunizations rev'd shingrix, pneumo, RSV  BMI 32    Follow up    Diarrhea using pepto bismol with alcohol  H/o colon polyps  Colonoscopy 9-24 small polyps  EGD inflammation on protonix     Cold feet x chronic  Claudication sx  Vascular consult     back pain predominantly right-sided with right-sided hip pain better  Fed up with pharmacy  stopped med     DM  this a.m. on insulin no side effects  Follow up optho  HBA1C 6.3  12-23  nephropathy     Hypercholesterolemia on therapy no side effects     Hypertension stable on therapy no side effects     Prostate cancer  Didn't see urology     smoking and alcohol  cessation discussed      Diet and exercise rev'd    Check labs before appt    Follow up 3 months

## 2024-09-20 DIAGNOSIS — Z79.4 TYPE 2 DIABETES MELLITUS WITH OTHER SPECIFIED COMPLICATION, WITH LONG-TERM CURRENT USE OF INSULIN: ICD-10-CM

## 2024-09-20 DIAGNOSIS — E11.69 TYPE 2 DIABETES MELLITUS WITH OTHER SPECIFIED COMPLICATION, WITH LONG-TERM CURRENT USE OF INSULIN: ICD-10-CM

## 2024-09-23 RX ORDER — INSULIN DETEMIR 100 [IU]/ML
42 INJECTION, SOLUTION SUBCUTANEOUS NIGHTLY
Qty: 37.8 ML | Refills: 0 | Status: SHIPPED | OUTPATIENT
Start: 2024-09-23 | End: 2024-12-22

## 2024-09-27 DIAGNOSIS — E55.9 VITAMIN D DEFICIENCY, UNSPECIFIED: ICD-10-CM

## 2024-09-29 RX ORDER — ACETAMINOPHEN 500 MG
TABLET ORAL
Qty: 90 CAPSULE | Refills: 0 | Status: SHIPPED | OUTPATIENT
Start: 2024-09-29

## 2024-09-30 ENCOUNTER — TELEPHONE (OUTPATIENT)
Dept: GASTROENTEROLOGY | Facility: CLINIC | Age: 69
End: 2024-09-30
Payer: MEDICARE

## 2024-09-30 NOTE — TELEPHONE ENCOUNTER
----- Message from Dany Malagon sent at 9/21/2024  1:35 PM EDT -----  Path report on colon polyps-precancerous polyp, called tubular adenoma.  Follow-up colonoscopy in 1 year.    Braden please advise patient.

## 2024-10-08 DIAGNOSIS — K29.00 ACUTE GASTRITIS WITHOUT HEMORRHAGE, UNSPECIFIED GASTRITIS TYPE: ICD-10-CM

## 2024-10-08 RX ORDER — PANTOPRAZOLE SODIUM 40 MG/1
40 TABLET, DELAYED RELEASE ORAL
Qty: 90 TABLET | Refills: 0 | Status: SHIPPED | OUTPATIENT
Start: 2024-10-08 | End: 2025-04-06

## 2024-10-09 DIAGNOSIS — I10 BENIGN ESSENTIAL HYPERTENSION: ICD-10-CM

## 2024-10-09 DIAGNOSIS — E78.00 HYPERCHOLESTEROLEMIA: ICD-10-CM

## 2024-10-09 RX ORDER — VALSARTAN AND HYDROCHLOROTHIAZIDE 160; 12.5 MG/1; MG/1
1 TABLET, FILM COATED ORAL DAILY
Qty: 90 TABLET | Refills: 0 | Status: SHIPPED | OUTPATIENT
Start: 2024-10-09

## 2024-10-09 RX ORDER — ATORVASTATIN CALCIUM 20 MG/1
20 TABLET, FILM COATED ORAL NIGHTLY
Qty: 90 TABLET | Refills: 0 | Status: SHIPPED | OUTPATIENT
Start: 2024-10-09 | End: 2025-01-07

## 2024-12-04 DIAGNOSIS — Z79.4 TYPE 2 DIABETES MELLITUS WITH OTHER SPECIFIED COMPLICATION, WITH LONG-TERM CURRENT USE OF INSULIN: ICD-10-CM

## 2024-12-04 DIAGNOSIS — E11.69 TYPE 2 DIABETES MELLITUS WITH OTHER SPECIFIED COMPLICATION, WITH LONG-TERM CURRENT USE OF INSULIN: ICD-10-CM

## 2024-12-04 RX ORDER — SYRINGE AND NEEDLE,INSULIN,1ML 28GX1/2"
SYRINGE, EMPTY DISPOSABLE MISCELLANEOUS
Qty: 400 EACH | Refills: 0 | Status: SHIPPED | OUTPATIENT
Start: 2024-12-04

## 2024-12-12 ENCOUNTER — LAB (OUTPATIENT)
Dept: LAB | Facility: LAB | Age: 69
End: 2024-12-12
Payer: MEDICARE

## 2024-12-12 DIAGNOSIS — Z79.4 TYPE 2 DIABETES MELLITUS WITH OTHER SPECIFIED COMPLICATION, WITH LONG-TERM CURRENT USE OF INSULIN: ICD-10-CM

## 2024-12-12 DIAGNOSIS — E11.69 TYPE 2 DIABETES MELLITUS WITH OTHER SPECIFIED COMPLICATION, WITH LONG-TERM CURRENT USE OF INSULIN: ICD-10-CM

## 2024-12-12 DIAGNOSIS — C61 PROSTATE CANCER (MULTI): ICD-10-CM

## 2024-12-12 DIAGNOSIS — E55.9 VITAMIN D DEFICIENCY: ICD-10-CM

## 2024-12-12 DIAGNOSIS — N18.31 STAGE 3A CHRONIC KIDNEY DISEASE (MULTI): ICD-10-CM

## 2024-12-12 DIAGNOSIS — Z00.00 ANNUAL PHYSICAL EXAM: ICD-10-CM

## 2024-12-12 LAB
25(OH)D3 SERPL-MCNC: 35 NG/ML (ref 30–100)
ALBUMIN SERPL BCP-MCNC: 4.3 G/DL (ref 3.4–5)
ALP SERPL-CCNC: 70 U/L (ref 33–136)
ALT SERPL W P-5'-P-CCNC: 29 U/L (ref 10–52)
ANION GAP SERPL CALC-SCNC: 12 MMOL/L (ref 10–20)
AST SERPL W P-5'-P-CCNC: 17 U/L (ref 9–39)
BASOPHILS # BLD AUTO: 0.04 X10*3/UL (ref 0–0.1)
BASOPHILS NFR BLD AUTO: 0.7 %
BILIRUB SERPL-MCNC: 0.6 MG/DL (ref 0–1.2)
BUN SERPL-MCNC: 23 MG/DL (ref 6–23)
CALCIUM SERPL-MCNC: 9.1 MG/DL (ref 8.6–10.3)
CHLORIDE SERPL-SCNC: 101 MMOL/L (ref 98–107)
CHOLEST SERPL-MCNC: 163 MG/DL (ref 0–199)
CHOLESTEROL/HDL RATIO: 2.5
CO2 SERPL-SCNC: 29 MMOL/L (ref 21–32)
CREAT SERPL-MCNC: 1.36 MG/DL (ref 0.5–1.3)
CREAT UR-MCNC: 128.2 MG/DL (ref 20–370)
EGFRCR SERPLBLD CKD-EPI 2021: 57 ML/MIN/1.73M*2
EOSINOPHIL # BLD AUTO: 0.06 X10*3/UL (ref 0–0.7)
EOSINOPHIL NFR BLD AUTO: 1 %
ERYTHROCYTE [DISTWIDTH] IN BLOOD BY AUTOMATED COUNT: 14.5 % (ref 11.5–14.5)
EST. AVERAGE GLUCOSE BLD GHB EST-MCNC: 151 MG/DL
GLUCOSE SERPL-MCNC: 102 MG/DL (ref 74–99)
HBA1C MFR BLD: 6.9 %
HCT VFR BLD AUTO: 43.2 % (ref 41–52)
HDLC SERPL-MCNC: 64.1 MG/DL
HGB BLD-MCNC: 14.9 G/DL (ref 13.5–17.5)
IMM GRANULOCYTES # BLD AUTO: 0.03 X10*3/UL (ref 0–0.7)
IMM GRANULOCYTES NFR BLD AUTO: 0.5 % (ref 0–0.9)
LDLC SERPL CALC-MCNC: 77 MG/DL
LYMPHOCYTES # BLD AUTO: 1.52 X10*3/UL (ref 1.2–4.8)
LYMPHOCYTES NFR BLD AUTO: 25.1 %
MCH RBC QN AUTO: 30.7 PG (ref 26–34)
MCHC RBC AUTO-ENTMCNC: 34.5 G/DL (ref 32–36)
MCV RBC AUTO: 89 FL (ref 80–100)
MICROALBUMIN UR-MCNC: 411.1 MG/L
MICROALBUMIN/CREAT UR: 320.7 UG/MG CREAT
MONOCYTES # BLD AUTO: 0.5 X10*3/UL (ref 0.1–1)
MONOCYTES NFR BLD AUTO: 8.3 %
NEUTROPHILS # BLD AUTO: 3.9 X10*3/UL (ref 1.2–7.7)
NEUTROPHILS NFR BLD AUTO: 64.4 %
NON HDL CHOLESTEROL: 99 MG/DL (ref 0–149)
NRBC BLD-RTO: 0 /100 WBCS (ref 0–0)
PLATELET # BLD AUTO: 316 X10*3/UL (ref 150–450)
POTASSIUM SERPL-SCNC: 4.2 MMOL/L (ref 3.5–5.3)
PROT SERPL-MCNC: 7.1 G/DL (ref 6.4–8.2)
RBC # BLD AUTO: 4.86 X10*6/UL (ref 4.5–5.9)
SODIUM SERPL-SCNC: 138 MMOL/L (ref 136–145)
T4 FREE SERPL-MCNC: 0.97 NG/DL (ref 0.61–1.12)
TRIGL SERPL-MCNC: 108 MG/DL (ref 0–149)
TSH SERPL-ACNC: 4.06 MIU/L (ref 0.44–3.98)
VLDL: 22 MG/DL (ref 0–40)
WBC # BLD AUTO: 6.1 X10*3/UL (ref 4.4–11.3)

## 2024-12-12 PROCEDURE — 82570 ASSAY OF URINE CREATININE: CPT

## 2024-12-12 PROCEDURE — 83036 HEMOGLOBIN GLYCOSYLATED A1C: CPT

## 2024-12-12 PROCEDURE — 82306 VITAMIN D 25 HYDROXY: CPT

## 2024-12-12 PROCEDURE — 84154 ASSAY OF PSA FREE: CPT

## 2024-12-12 PROCEDURE — 82043 UR ALBUMIN QUANTITATIVE: CPT

## 2024-12-12 PROCEDURE — 84153 ASSAY OF PSA TOTAL: CPT

## 2024-12-12 PROCEDURE — 36415 COLL VENOUS BLD VENIPUNCTURE: CPT

## 2024-12-14 LAB
PSA FREE MFR SERPL: NORMAL %
PSA FREE SERPL-MCNC: <0.1 NG/ML
PSA SERPL IA-MCNC: <0.1 NG/ML (ref 0–4)

## 2024-12-15 RX ORDER — INSULIN DETEMIR 100 [IU]/ML
INJECTION, SOLUTION SUBCUTANEOUS
Qty: 37.8 ML | Refills: 0 | Status: SHIPPED | OUTPATIENT
Start: 2024-12-15 | End: 2024-12-18 | Stop reason: CLARIF

## 2024-12-18 ENCOUNTER — LAB (OUTPATIENT)
Dept: LAB | Facility: LAB | Age: 69
End: 2024-12-18
Payer: MEDICARE

## 2024-12-18 ENCOUNTER — APPOINTMENT (OUTPATIENT)
Dept: PRIMARY CARE | Facility: CLINIC | Age: 69
End: 2024-12-18
Payer: MEDICARE

## 2024-12-18 VITALS
HEART RATE: 73 BPM | DIASTOLIC BLOOD PRESSURE: 82 MMHG | SYSTOLIC BLOOD PRESSURE: 146 MMHG | BODY MASS INDEX: 33.98 KG/M2 | TEMPERATURE: 97.3 F | OXYGEN SATURATION: 100 % | WEIGHT: 236.8 LBS

## 2024-12-18 DIAGNOSIS — E55.9 VITAMIN D DEFICIENCY, UNSPECIFIED: ICD-10-CM

## 2024-12-18 DIAGNOSIS — F12.90 MARIJUANA SMOKER: ICD-10-CM

## 2024-12-18 DIAGNOSIS — E66.09 CLASS 1 OBESITY DUE TO EXCESS CALORIES WITH SERIOUS COMORBIDITY AND BODY MASS INDEX (BMI) OF 33.0 TO 33.9 IN ADULT: ICD-10-CM

## 2024-12-18 DIAGNOSIS — I73.9 CLAUDICATION (CMS-HCC): ICD-10-CM

## 2024-12-18 DIAGNOSIS — E11.69 TYPE 2 DIABETES MELLITUS WITH OTHER SPECIFIED COMPLICATION, WITH LONG-TERM CURRENT USE OF INSULIN: ICD-10-CM

## 2024-12-18 DIAGNOSIS — I10 BENIGN ESSENTIAL HYPERTENSION: ICD-10-CM

## 2024-12-18 DIAGNOSIS — R79.89 ABNORMAL TSH: ICD-10-CM

## 2024-12-18 DIAGNOSIS — Z79.4 TYPE 2 DIABETES MELLITUS WITH OTHER SPECIFIED COMPLICATION, WITH LONG-TERM CURRENT USE OF INSULIN: ICD-10-CM

## 2024-12-18 DIAGNOSIS — Z71.2 ENCOUNTER TO DISCUSS TEST RESULTS: Primary | ICD-10-CM

## 2024-12-18 DIAGNOSIS — E66.811 CLASS 1 OBESITY DUE TO EXCESS CALORIES WITH SERIOUS COMORBIDITY AND BODY MASS INDEX (BMI) OF 33.0 TO 33.9 IN ADULT: ICD-10-CM

## 2024-12-18 DIAGNOSIS — C61 PROSTATE CANCER (MULTI): ICD-10-CM

## 2024-12-18 DIAGNOSIS — Z23 IMMUNIZATION DUE: ICD-10-CM

## 2024-12-18 DIAGNOSIS — E78.00 HYPERCHOLESTEROLEMIA: ICD-10-CM

## 2024-12-18 LAB — THYROPEROXIDASE AB SERPL-ACNC: 31 IU/ML

## 2024-12-18 PROCEDURE — 86376 MICROSOMAL ANTIBODY EACH: CPT

## 2024-12-18 PROCEDURE — G2211 COMPLEX E/M VISIT ADD ON: HCPCS | Performed by: INTERNAL MEDICINE

## 2024-12-18 PROCEDURE — G0446 INTENS BEHAVE THER CARDIO DX: HCPCS | Performed by: INTERNAL MEDICINE

## 2024-12-18 PROCEDURE — 90662 IIV NO PRSV INCREASED AG IM: CPT | Performed by: INTERNAL MEDICINE

## 2024-12-18 PROCEDURE — 3062F POS MACROALBUMINURIA REV: CPT | Performed by: INTERNAL MEDICINE

## 2024-12-18 PROCEDURE — 1160F RVW MEDS BY RX/DR IN RCRD: CPT | Performed by: INTERNAL MEDICINE

## 2024-12-18 PROCEDURE — 3048F LDL-C <100 MG/DL: CPT | Performed by: INTERNAL MEDICINE

## 2024-12-18 PROCEDURE — 36415 COLL VENOUS BLD VENIPUNCTURE: CPT

## 2024-12-18 PROCEDURE — G0008 ADMIN INFLUENZA VIRUS VAC: HCPCS | Performed by: INTERNAL MEDICINE

## 2024-12-18 PROCEDURE — 3044F HG A1C LEVEL LT 7.0%: CPT | Performed by: INTERNAL MEDICINE

## 2024-12-18 PROCEDURE — 3079F DIAST BP 80-89 MM HG: CPT | Performed by: INTERNAL MEDICINE

## 2024-12-18 PROCEDURE — 3077F SYST BP >= 140 MM HG: CPT | Performed by: INTERNAL MEDICINE

## 2024-12-18 PROCEDURE — 99214 OFFICE O/P EST MOD 30 MIN: CPT | Performed by: INTERNAL MEDICINE

## 2024-12-18 PROCEDURE — 1159F MED LIST DOCD IN RCRD: CPT | Performed by: INTERNAL MEDICINE

## 2024-12-18 PROCEDURE — 1036F TOBACCO NON-USER: CPT | Performed by: INTERNAL MEDICINE

## 2024-12-18 RX ORDER — VALSARTAN AND HYDROCHLOROTHIAZIDE 160; 12.5 MG/1; MG/1
1 TABLET, FILM COATED ORAL DAILY
Qty: 90 TABLET | Refills: 0 | Status: SHIPPED | OUTPATIENT
Start: 2024-12-18

## 2024-12-18 RX ORDER — INSULIN LISPRO 100 [IU]/ML
INJECTION, SOLUTION INTRAVENOUS; SUBCUTANEOUS
Qty: 10 ML | Refills: 2 | Status: SHIPPED | OUTPATIENT
Start: 2024-12-18

## 2024-12-18 RX ORDER — ATORVASTATIN CALCIUM 20 MG/1
20 TABLET, FILM COATED ORAL NIGHTLY
Qty: 90 TABLET | Refills: 0 | Status: SHIPPED | OUTPATIENT
Start: 2024-12-18 | End: 2025-03-18

## 2024-12-18 RX ORDER — METFORMIN HYDROCHLORIDE 1000 MG/1
1000 TABLET ORAL
Qty: 180 TABLET | Refills: 0 | Status: SHIPPED | OUTPATIENT
Start: 2024-12-18

## 2024-12-18 RX ORDER — INSULIN GLARGINE 100 [IU]/ML
50 INJECTION, SOLUTION SUBCUTANEOUS EVERY 24 HOURS
Qty: 45 ML | Refills: 0 | Status: SHIPPED | OUTPATIENT
Start: 2024-12-18 | End: 2025-03-18

## 2024-12-18 NOTE — PROGRESS NOTES
Subjective   Patient ID: Tyshawn Wilkerson is a 69 y.o. male who presents for Follow-up (3 month ).  HPI    Routine follow up    Labs rev'd    Abnormal TSH  Check TPO     Diarrhea resolved  H/o colon polyps  Colonoscopy 9-24 small polyps  EGD inflammation on protonix     Cold feet x chronic  Claudication sx  Vascular consult not done yet     back pain predominantly right-sided with right-sided hip pain better  Fed up with pharmacy  stopped med     DM   FBS  200 this a.m. was out of levemir   Follow up optho  HBA1C 6.9  12-24  nephropathy     Hypercholesterolemia stable on therapy no side effects     Hypertension stable on therapy no side effects     Prostate cancer  Didn't see urology     smoking and alcohol  cessation discussed      Diet and exercise rev'd     Review of Systems   All other systems reviewed and are negative.      Objective   /82   Pulse 73   Temp 36.3 °C (97.3 °F)   Wt 107 kg (236 lb 12.8 oz)   SpO2 100%   BMI 33.98 kg/m²   Lab Results   Component Value Date    WBC 6.1 12/12/2024    HGB 14.9 12/12/2024    HCT 43.2 12/12/2024     12/12/2024    CHOL 163 12/12/2024    TRIG 108 12/12/2024    HDL 64.1 12/12/2024    ALT 29 12/12/2024    AST 17 12/12/2024     12/12/2024    K 4.2 12/12/2024     12/12/2024    CREATININE 1.36 (H) 12/12/2024    BUN 23 12/12/2024    CO2 29 12/12/2024    TSH 4.06 (H) 12/12/2024    PSA <0.1 12/12/2024    INR 0.9 08/11/2020    HGBA1C 6.9 (H) 12/12/2024           Physical Exam  Vitals reviewed.   Constitutional:       Appearance: Normal appearance. He is obese.   HENT:      Head: Normocephalic and atraumatic.      Mouth/Throat:      Pharynx: No posterior oropharyngeal erythema.   Eyes:      General: No scleral icterus.     Conjunctiva/sclera: Conjunctivae normal.      Pupils: Pupils are equal, round, and reactive to light.   Cardiovascular:      Rate and Rhythm: Normal rate and regular rhythm.      Heart sounds: Normal heart sounds.   Pulmonary:       Effort: No respiratory distress.      Breath sounds: No wheezing.   Abdominal:      General: Abdomen is flat. Bowel sounds are normal. There is no distension.      Palpations: Abdomen is soft. There is no mass.      Tenderness: There is no abdominal tenderness. There is no rebound.   Musculoskeletal:         General: Normal range of motion.      Cervical back: Normal range of motion and neck supple.   Skin:     General: Skin is warm and dry.   Neurological:      General: No focal deficit present.      Mental Status: He is alert and oriented to person, place, and time. Mental status is at baseline.   Psychiatric:         Mood and Affect: Mood normal.         Behavior: Behavior normal.         Thought Content: Thought content normal.         Judgment: Judgment normal.         Problem List Items Addressed This Visit             ICD-10-CM    Prostate cancer (Multi) C61    Obesity E66.9     Other Visit Diagnoses         Codes    Encounter to discuss test results    -  Primary Z71.2    Type 2 diabetes mellitus with other specified complication, with long-term current use of insulin     E11.69, Z79.4    Relevant Medications    HumaLOG U-100 Insulin 100 unit/mL injection    insulin glargine (Lantus) 100 unit/mL injection    metFORMIN (Glucophage) 1,000 mg tablet    Hypercholesterolemia     E78.00    Relevant Medications    atorvastatin (Lipitor) 20 mg tablet    Benign essential hypertension     I10    Relevant Medications    valsartan-hydrochlorothiazide (Diovan-HCT) 160-12.5 mg tablet    Claudication (CMS-HCC)     I73.9    Abnormal TSH     R79.89    Relevant Orders    Thyroid Peroxidase (TPO) Antibody    Vitamin D deficiency, unspecified     E55.9    Marijuana smoker     F12.90    Immunization due     Z23    Relevant Orders    Flu vaccine, trivalent, preservative free, HIGH-DOSE, age 65y+ (Fluzone) (Completed)          Assessment/Plan     Labs rev'd    Abnormal TSH  Check TPO     Diarrhea resolved  H/o colon  polyps  Colonoscopy 9-24 small polyps  EGD inflammation on protonix     Cold feet x chronic  Claudication sx  Vascular consult not done yet     back pain predominantly right-sided with right-sided hip pain better  Fed up with pharmacy  stopped med     DM   FBS  200 this a.m. was out of levemir   Follow up optho  HBA1C 6.9  12-24  nephropathy     Hypercholesterolemia stable on therapy no side effects  I spent 15 minutes face-to-face with this individual discussing their cardiovascular risk and behavioral therapies of nutritional choices, exercise, and elimination of habits contributing to risk. We agreed on plan how they may be able to reduce their current cardiovascular risk.  Patient 10 year cardiac risk estimate calculates :   29.4    %      Hypertension stable on therapy no side effects     Prostate cancer  Didn't see urology     smoking discussed  alcohol  stopped     Diet and exercise rev'd    Prostate 12-24  Colonoscopy 9-24  recheck 25  CT chest lung cancer screening n/a  immunizations rev'd shingrix, pneumo, RSV, COVID 19  BMI 33.9    Flu shot given    Follow up 6 weeks

## 2024-12-27 DIAGNOSIS — E55.9 VITAMIN D DEFICIENCY, UNSPECIFIED: ICD-10-CM

## 2024-12-29 RX ORDER — ACETAMINOPHEN 500 MG
TABLET ORAL
Qty: 90 CAPSULE | Refills: 0 | Status: SHIPPED | OUTPATIENT
Start: 2024-12-29

## 2024-12-30 DIAGNOSIS — E11.69 TYPE 2 DIABETES MELLITUS WITH OTHER SPECIFIED COMPLICATION, WITH LONG-TERM CURRENT USE OF INSULIN: ICD-10-CM

## 2024-12-30 DIAGNOSIS — Z79.4 TYPE 2 DIABETES MELLITUS WITH OTHER SPECIFIED COMPLICATION, WITH LONG-TERM CURRENT USE OF INSULIN: ICD-10-CM

## 2024-12-31 DIAGNOSIS — E55.9 VITAMIN D DEFICIENCY, UNSPECIFIED: ICD-10-CM

## 2025-01-02 RX ORDER — BLOOD SUGAR DIAGNOSTIC
STRIP MISCELLANEOUS
Qty: 100 STRIP | Refills: 11 | Status: SHIPPED | OUTPATIENT
Start: 2025-01-02

## 2025-01-02 RX ORDER — ACETAMINOPHEN 500 MG
TABLET ORAL
Qty: 90 CAPSULE | Refills: 0 | OUTPATIENT
Start: 2025-01-02

## 2025-01-05 DIAGNOSIS — K29.00 ACUTE GASTRITIS WITHOUT HEMORRHAGE, UNSPECIFIED GASTRITIS TYPE: ICD-10-CM

## 2025-01-06 RX ORDER — PANTOPRAZOLE SODIUM 40 MG/1
40 TABLET, DELAYED RELEASE ORAL
Qty: 30 TABLET | Refills: 0 | Status: SHIPPED | OUTPATIENT
Start: 2025-01-06 | End: 2025-02-05

## 2025-01-29 ENCOUNTER — APPOINTMENT (OUTPATIENT)
Dept: PRIMARY CARE | Facility: CLINIC | Age: 70
End: 2025-01-29
Payer: MEDICARE

## 2025-01-29 VITALS
HEART RATE: 75 BPM | DIASTOLIC BLOOD PRESSURE: 82 MMHG | TEMPERATURE: 98 F | WEIGHT: 238 LBS | SYSTOLIC BLOOD PRESSURE: 122 MMHG | BODY MASS INDEX: 34.15 KG/M2 | OXYGEN SATURATION: 97 %

## 2025-01-29 DIAGNOSIS — E78.00 HYPERCHOLESTEROLEMIA: ICD-10-CM

## 2025-01-29 DIAGNOSIS — M17.9 OSTEOARTHRITIS OF KNEE, UNSPECIFIED LATERALITY, UNSPECIFIED OSTEOARTHRITIS TYPE: ICD-10-CM

## 2025-01-29 DIAGNOSIS — N18.31 STAGE 3A CHRONIC KIDNEY DISEASE (MULTI): ICD-10-CM

## 2025-01-29 DIAGNOSIS — E11.69 TYPE 2 DIABETES MELLITUS WITH OTHER SPECIFIED COMPLICATION, WITH LONG-TERM CURRENT USE OF INSULIN: Primary | ICD-10-CM

## 2025-01-29 DIAGNOSIS — E66.09 CLASS 1 OBESITY DUE TO EXCESS CALORIES WITH SERIOUS COMORBIDITY AND BODY MASS INDEX (BMI) OF 34.0 TO 34.9 IN ADULT: ICD-10-CM

## 2025-01-29 DIAGNOSIS — Z79.4 TYPE 2 DIABETES MELLITUS WITH OTHER SPECIFIED COMPLICATION, WITH LONG-TERM CURRENT USE OF INSULIN: Primary | ICD-10-CM

## 2025-01-29 DIAGNOSIS — C61 PROSTATE CANCER (MULTI): ICD-10-CM

## 2025-01-29 DIAGNOSIS — E66.811 CLASS 1 OBESITY DUE TO EXCESS CALORIES WITH SERIOUS COMORBIDITY AND BODY MASS INDEX (BMI) OF 34.0 TO 34.9 IN ADULT: ICD-10-CM

## 2025-01-29 DIAGNOSIS — I10 BENIGN ESSENTIAL HYPERTENSION: ICD-10-CM

## 2025-01-29 PROCEDURE — 1036F TOBACCO NON-USER: CPT | Performed by: INTERNAL MEDICINE

## 2025-01-29 PROCEDURE — 99214 OFFICE O/P EST MOD 30 MIN: CPT | Performed by: INTERNAL MEDICINE

## 2025-01-29 PROCEDURE — 1160F RVW MEDS BY RX/DR IN RCRD: CPT | Performed by: INTERNAL MEDICINE

## 2025-01-29 PROCEDURE — 3074F SYST BP LT 130 MM HG: CPT | Performed by: INTERNAL MEDICINE

## 2025-01-29 PROCEDURE — 1159F MED LIST DOCD IN RCRD: CPT | Performed by: INTERNAL MEDICINE

## 2025-01-29 PROCEDURE — 3079F DIAST BP 80-89 MM HG: CPT | Performed by: INTERNAL MEDICINE

## 2025-01-29 PROCEDURE — G2211 COMPLEX E/M VISIT ADD ON: HCPCS | Performed by: INTERNAL MEDICINE

## 2025-01-29 RX ORDER — INSULIN LISPRO 100 [IU]/ML
INJECTION, SOLUTION INTRAVENOUS; SUBCUTANEOUS
Qty: 30 ML | Refills: 1 | Status: SHIPPED | OUTPATIENT
Start: 2025-01-29

## 2025-01-29 RX ORDER — INSULIN GLARGINE 100 [IU]/ML
50 INJECTION, SOLUTION SUBCUTANEOUS EVERY 24 HOURS
Qty: 45 ML | Refills: 0 | Status: CANCELLED | OUTPATIENT
Start: 2025-01-29 | End: 2025-04-29

## 2025-01-29 NOTE — PROGRESS NOTES
Subjective   Patient ID: Tyshawn Wilkerson is a 69 y.o. male who presents for Follow-up (6 weeks ///Handicap placard renewal ).  HPI    Follow up BS    DM on insulin  FBS  208 this am  Follow up optho  HBA1C 6.9  12-24  nephropathy     Abnormal TSH  TPO ok 12-24     Diarrhea resolved  H/o colon polyps  Colonoscopy 9-24 small polyps  EGD inflammation on protonix     Cold feet x chronic  Claudication sx  Vascular consult not done yet     back pain predominantly right-sided with right-sided hip pain better  Fed up with pharmacy  stopped med     Hypercholesterolemia stable on therapy no side effects     Hypertension stable on therapy no side effects     Prostate cancer  Didn't see urology     smoking discussed  alcohol  stopped     Diet and exercise rev'd    Review of Systems   All other systems reviewed and are negative.      Objective   /82   Pulse 75   Temp 36.7 °C (98 °F)   Wt 108 kg (238 lb)   SpO2 97%   BMI 34.15 kg/m²   Lab Results   Component Value Date    WBC 6.1 12/12/2024    HGB 14.9 12/12/2024    HCT 43.2 12/12/2024     12/12/2024    CHOL 163 12/12/2024    TRIG 108 12/12/2024    HDL 64.1 12/12/2024    ALT 29 12/12/2024    AST 17 12/12/2024     12/12/2024    K 4.2 12/12/2024     12/12/2024    CREATININE 1.36 (H) 12/12/2024    BUN 23 12/12/2024    CO2 29 12/12/2024    TSH 4.06 (H) 12/12/2024    PSA <0.1 12/12/2024    INR 0.9 08/11/2020    HGBA1C 6.9 (H) 12/12/2024           Physical Exam  Vitals reviewed.   Constitutional:       Appearance: Normal appearance. He is obese.   HENT:      Head: Normocephalic and atraumatic.      Mouth/Throat:      Pharynx: No posterior oropharyngeal erythema.   Eyes:      General: No scleral icterus.     Conjunctiva/sclera: Conjunctivae normal.      Pupils: Pupils are equal, round, and reactive to light.   Cardiovascular:      Rate and Rhythm: Normal rate and regular rhythm.      Heart sounds: Normal heart sounds.   Pulmonary:      Effort: No  respiratory distress.      Breath sounds: No wheezing.   Abdominal:      General: Abdomen is flat. Bowel sounds are normal. There is no distension.      Palpations: Abdomen is soft. There is no mass.      Tenderness: There is no abdominal tenderness. There is no rebound.   Musculoskeletal:         General: Normal range of motion.      Cervical back: Normal range of motion and neck supple.   Skin:     General: Skin is warm and dry.   Neurological:      General: No focal deficit present.      Mental Status: He is alert and oriented to person, place, and time. Mental status is at baseline.   Psychiatric:         Mood and Affect: Mood normal.         Behavior: Behavior normal.         Thought Content: Thought content normal.         Judgment: Judgment normal.         Problem List Items Addressed This Visit             ICD-10-CM    DJD (degenerative joint disease) of knee M17.9    Relevant Orders    Disability Placard    Prostate cancer (Multi) C61    Stage 3a chronic kidney disease (Multi) N18.31    Obesity E66.9     Other Visit Diagnoses         Codes    Type 2 diabetes mellitus with other specified complication, with long-term current use of insulin    -  Primary E11.69, Z79.4    Relevant Medications    HumaLOG U-100 Insulin 100 unit/mL injection    Hypercholesterolemia     E78.00    Benign essential hypertension     I10          Assessment/Plan     Follow up BS    DM on insulin  FBS  208 this am  Follow up optho  HBA1C 6.9  12-24  nephropathy     Abnormal TSH  TPO ok 12-24     Diarrhea resolved  H/o colon polyps  Colonoscopy 9-24 small polyps  EGD inflammation on protonix     Cold feet x chronic  Claudication sx  Vascular consult not done yet     back pain predominantly right-sided with right-sided hip pain better  Fed up with pharmacy  stopped med     Hypercholesterolemia stable on therapy no side effects     Hypertension stable on therapy no side effects     Prostate cancer  Didn't see urology     smoking discussed  / marijuana  alcohol  stopped     Diet and exercise rev'd    Prostate 12-24  Colonoscopy 9-24  recheck 25  CT chest lung cancer screening n/a  immunizations rev'd shingrix, pneumo, RSV, COVID 19  BMI 34.1    Follow up 3 months

## 2025-01-31 DIAGNOSIS — K29.00 ACUTE GASTRITIS WITHOUT HEMORRHAGE, UNSPECIFIED GASTRITIS TYPE: ICD-10-CM

## 2025-02-02 RX ORDER — PANTOPRAZOLE SODIUM 40 MG/1
40 TABLET, DELAYED RELEASE ORAL
Qty: 90 TABLET | Refills: 0 | Status: SHIPPED | OUTPATIENT
Start: 2025-02-02

## 2025-02-15 DIAGNOSIS — E55.9 VITAMIN D DEFICIENCY, UNSPECIFIED: ICD-10-CM

## 2025-02-15 DIAGNOSIS — E11.69 TYPE 2 DIABETES MELLITUS WITH OTHER SPECIFIED COMPLICATION, WITH LONG-TERM CURRENT USE OF INSULIN: ICD-10-CM

## 2025-02-15 DIAGNOSIS — Z79.4 TYPE 2 DIABETES MELLITUS WITH OTHER SPECIFIED COMPLICATION, WITH LONG-TERM CURRENT USE OF INSULIN: ICD-10-CM

## 2025-02-17 RX ORDER — SYRINGE AND NEEDLE,INSULIN,1ML 28GX1/2"
SYRINGE, EMPTY DISPOSABLE MISCELLANEOUS
Qty: 400 EACH | Refills: 1 | Status: SHIPPED | OUTPATIENT
Start: 2025-02-17

## 2025-02-17 RX ORDER — ACETAMINOPHEN 500 MG
TABLET ORAL
Qty: 90 CAPSULE | Refills: 0 | Status: SHIPPED | OUTPATIENT
Start: 2025-02-17

## 2025-03-16 DIAGNOSIS — Z79.4 TYPE 2 DIABETES MELLITUS WITH OTHER SPECIFIED COMPLICATION, WITH LONG-TERM CURRENT USE OF INSULIN: ICD-10-CM

## 2025-03-16 DIAGNOSIS — I10 BENIGN ESSENTIAL HYPERTENSION: ICD-10-CM

## 2025-03-16 DIAGNOSIS — E78.00 HYPERCHOLESTEROLEMIA: ICD-10-CM

## 2025-03-16 DIAGNOSIS — E11.69 TYPE 2 DIABETES MELLITUS WITH OTHER SPECIFIED COMPLICATION, WITH LONG-TERM CURRENT USE OF INSULIN: ICD-10-CM

## 2025-03-17 RX ORDER — METFORMIN HYDROCHLORIDE 1000 MG/1
1000 TABLET ORAL
Qty: 180 TABLET | Refills: 0 | Status: SHIPPED | OUTPATIENT
Start: 2025-03-17

## 2025-03-17 RX ORDER — ATORVASTATIN CALCIUM 20 MG/1
20 TABLET, FILM COATED ORAL NIGHTLY
Qty: 90 TABLET | Refills: 0 | Status: SHIPPED | OUTPATIENT
Start: 2025-03-17 | End: 2025-06-15

## 2025-03-17 RX ORDER — VALSARTAN AND HYDROCHLOROTHIAZIDE 160; 12.5 MG/1; MG/1
1 TABLET, FILM COATED ORAL DAILY
Qty: 90 TABLET | Refills: 0 | Status: SHIPPED | OUTPATIENT
Start: 2025-03-17

## 2025-03-17 RX ORDER — INSULIN GLARGINE 100 [IU]/ML
INJECTION, SOLUTION SUBCUTANEOUS
Qty: 50 ML | Refills: 0 | Status: SHIPPED | OUTPATIENT
Start: 2025-03-17

## 2025-04-30 ENCOUNTER — APPOINTMENT (OUTPATIENT)
Dept: PRIMARY CARE | Facility: CLINIC | Age: 70
End: 2025-04-30
Payer: MEDICARE

## 2025-04-30 VITALS
OXYGEN SATURATION: 98 % | BODY MASS INDEX: 35.07 KG/M2 | TEMPERATURE: 97.8 F | DIASTOLIC BLOOD PRESSURE: 78 MMHG | SYSTOLIC BLOOD PRESSURE: 142 MMHG | HEART RATE: 76 BPM | WEIGHT: 244.4 LBS

## 2025-04-30 DIAGNOSIS — F12.90 MARIJUANA SMOKER: ICD-10-CM

## 2025-04-30 DIAGNOSIS — E66.01 CLASS 2 SEVERE OBESITY DUE TO EXCESS CALORIES WITH SERIOUS COMORBIDITY AND BODY MASS INDEX (BMI) OF 35.0 TO 35.9 IN ADULT: ICD-10-CM

## 2025-04-30 DIAGNOSIS — E66.812 CLASS 2 SEVERE OBESITY DUE TO EXCESS CALORIES WITH SERIOUS COMORBIDITY AND BODY MASS INDEX (BMI) OF 35.0 TO 35.9 IN ADULT: ICD-10-CM

## 2025-04-30 DIAGNOSIS — Z79.4 TYPE 2 DIABETES MELLITUS WITH OTHER SPECIFIED COMPLICATION, WITH LONG-TERM CURRENT USE OF INSULIN: Primary | ICD-10-CM

## 2025-04-30 DIAGNOSIS — E55.9 VITAMIN D DEFICIENCY, UNSPECIFIED: ICD-10-CM

## 2025-04-30 DIAGNOSIS — I10 BENIGN ESSENTIAL HYPERTENSION: ICD-10-CM

## 2025-04-30 DIAGNOSIS — C61 PROSTATE CANCER (MULTI): ICD-10-CM

## 2025-04-30 DIAGNOSIS — E78.00 HYPERCHOLESTEROLEMIA: ICD-10-CM

## 2025-04-30 DIAGNOSIS — E11.69 TYPE 2 DIABETES MELLITUS WITH OTHER SPECIFIED COMPLICATION, WITH LONG-TERM CURRENT USE OF INSULIN: Primary | ICD-10-CM

## 2025-04-30 DIAGNOSIS — K29.00 ACUTE GASTRITIS WITHOUT HEMORRHAGE, UNSPECIFIED GASTRITIS TYPE: ICD-10-CM

## 2025-04-30 PROCEDURE — G2211 COMPLEX E/M VISIT ADD ON: HCPCS | Performed by: INTERNAL MEDICINE

## 2025-04-30 PROCEDURE — 3077F SYST BP >= 140 MM HG: CPT | Performed by: INTERNAL MEDICINE

## 2025-04-30 PROCEDURE — 1159F MED LIST DOCD IN RCRD: CPT | Performed by: INTERNAL MEDICINE

## 2025-04-30 PROCEDURE — 3078F DIAST BP <80 MM HG: CPT | Performed by: INTERNAL MEDICINE

## 2025-04-30 PROCEDURE — 99214 OFFICE O/P EST MOD 30 MIN: CPT | Performed by: INTERNAL MEDICINE

## 2025-04-30 PROCEDURE — 1036F TOBACCO NON-USER: CPT | Performed by: INTERNAL MEDICINE

## 2025-04-30 PROCEDURE — 1160F RVW MEDS BY RX/DR IN RCRD: CPT | Performed by: INTERNAL MEDICINE

## 2025-04-30 RX ORDER — INSULIN LISPRO 100 [IU]/ML
INJECTION, SOLUTION INTRAVENOUS; SUBCUTANEOUS
Qty: 30 ML | Refills: 1 | Status: SHIPPED | OUTPATIENT
Start: 2025-04-30

## 2025-04-30 RX ORDER — METFORMIN HYDROCHLORIDE 1000 MG/1
1000 TABLET ORAL
Qty: 180 TABLET | Refills: 0 | Status: SHIPPED | OUTPATIENT
Start: 2025-04-30

## 2025-04-30 RX ORDER — VALSARTAN AND HYDROCHLOROTHIAZIDE 160; 12.5 MG/1; MG/1
1 TABLET, FILM COATED ORAL DAILY
Qty: 90 TABLET | Refills: 0 | Status: SHIPPED | OUTPATIENT
Start: 2025-04-30

## 2025-04-30 RX ORDER — ATORVASTATIN CALCIUM 20 MG/1
20 TABLET, FILM COATED ORAL NIGHTLY
Qty: 90 TABLET | Refills: 0 | Status: SHIPPED | OUTPATIENT
Start: 2025-04-30 | End: 2025-07-29

## 2025-04-30 RX ORDER — ACETAMINOPHEN 500 MG
50 TABLET ORAL DAILY
Qty: 90 CAPSULE | Refills: 0 | Status: SHIPPED | OUTPATIENT
Start: 2025-04-30

## 2025-04-30 RX ORDER — PANTOPRAZOLE SODIUM 40 MG/1
40 TABLET, DELAYED RELEASE ORAL
Qty: 90 TABLET | Refills: 0 | Status: SHIPPED | OUTPATIENT
Start: 2025-04-30

## 2025-04-30 NOTE — PROGRESS NOTES
Subjective   Patient ID: Tyshawn Wilkerson is a 69 y.o. male who presents for Follow-up (3 month ).  HPI    Routine follow up     Feels well     DM on insulin    this am ate late  Eating fast food  Follow up optho / not done yet  HBA1C 6.9  12-24  nephropathy     Abnormal TSH  TPO ok 12-24     Diarrhea resolved  H/o colon polyps  Colonoscopy 9-24 small polyps  EGD inflammation on protonix     Cold feet x chronic  Claudication sx  Vascular consult not done yet     back pain predominantly right-sided with right-sided hip pain better  Fed up with pharmacy  stopped med     Hypercholesterolemia stable on therapy no side effects     Hypertension stable on therapy no side effects     Prostate cancer  Didn't see urology     smoking discussed / marijuana  alcohol  stopped     Diet and exercise rev'd    Review of Systems   All other systems reviewed and are negative.      Objective   /78   Pulse 76   Temp 36.6 °C (97.8 °F)   Wt 111 kg (244 lb 6.4 oz)   SpO2 98%   BMI 35.07 kg/m²   Lab Results   Component Value Date    WBC 6.1 12/12/2024    HGB 14.9 12/12/2024    HCT 43.2 12/12/2024     12/12/2024    CHOL 163 12/12/2024    TRIG 108 12/12/2024    HDL 64.1 12/12/2024    ALT 29 12/12/2024    AST 17 12/12/2024     12/12/2024    K 4.2 12/12/2024     12/12/2024    CREATININE 1.36 (H) 12/12/2024    BUN 23 12/12/2024    CO2 29 12/12/2024    TSH 4.06 (H) 12/12/2024    PSA <0.1 12/12/2024    INR 0.9 08/11/2020    HGBA1C 6.9 (H) 12/12/2024           Physical Exam  Vitals reviewed.   Constitutional:       Appearance: Normal appearance. He is obese.   HENT:      Head: Normocephalic and atraumatic.      Mouth/Throat:      Pharynx: No posterior oropharyngeal erythema.   Eyes:      General: No scleral icterus.     Conjunctiva/sclera: Conjunctivae normal.      Pupils: Pupils are equal, round, and reactive to light.   Cardiovascular:      Rate and Rhythm: Normal rate and regular rhythm.      Heart sounds:  Normal heart sounds.   Pulmonary:      Effort: No respiratory distress.      Breath sounds: No wheezing.   Abdominal:      General: Abdomen is flat. Bowel sounds are normal. There is no distension.      Palpations: Abdomen is soft. There is no mass.      Tenderness: There is no abdominal tenderness. There is no rebound.   Musculoskeletal:         General: Normal range of motion.      Cervical back: Normal range of motion and neck supple.   Skin:     General: Skin is warm and dry.   Neurological:      General: No focal deficit present.      Mental Status: He is alert and oriented to person, place, and time. Mental status is at baseline.   Psychiatric:         Mood and Affect: Mood normal.         Behavior: Behavior normal.         Thought Content: Thought content normal.         Judgment: Judgment normal.         Problem List Items Addressed This Visit           ICD-10-CM    Prostate cancer (Multi) C61     Other Visit Diagnoses         Codes      Type 2 diabetes mellitus with other specified complication, with long-term current use of insulin    -  Primary E11.69, Z79.4    Relevant Medications    metFORMIN (Glucophage) 1,000 mg tablet    HumaLOG U-100 Insulin 100 unit/mL injection      Hypercholesterolemia     E78.00    Relevant Medications    atorvastatin (Lipitor) 20 mg tablet      Benign essential hypertension     I10    Relevant Medications    valsartan-hydrochlorothiazide (Diovan-HCT) 160-12.5 mg tablet      Acute gastritis without hemorrhage, unspecified gastritis type     K29.00    Relevant Medications    pantoprazole (ProtoNix) 40 mg EC tablet      Marijuana smoker     F12.90      Vitamin D deficiency, unspecified     E55.9    Relevant Medications    cholecalciferol (Vitamin D-3) 50 mcg (2,000 units) capsule      Class 2 severe obesity due to excess calories with serious comorbidity and body mass index (BMI) of 35.0 to 35.9 in adult     E66.812, E66.01, Z68.35          Assessment/Plan     Feels well    DM on  insulin    this am ate late  Eating fast food  Follow up optho / not done yet  HBA1C 6.9  12-24  nephropathy     Abnormal TSH  TPO ok 12-24     Diarrhea resolved  H/o colon polyps  Colonoscopy 9-24 small polyps  EGD inflammation on protonix     Cold feet x chronic  Claudication sx  Vascular consult not done yet     back pain predominantly right-sided with right-sided hip pain better  Fed up with pharmacy  stopped med     Hypercholesterolemia stable on therapy no side effects     Hypertension stable on therapy no side effects     Prostate cancer  Didn't see urology     smoking discussed / marijuana  alcohol  stopped     Diet and exercise rev'd    Prostate 12-24  Colonoscopy 9-24  recheck 25  CT chest lung cancer screening n/a  immunizations rev'd shingrix, pneumo, RSV, COVID 19  BMI 35     Follow up 2 months / medicare physical

## 2025-06-15 DIAGNOSIS — Z79.4 TYPE 2 DIABETES MELLITUS WITH OTHER SPECIFIED COMPLICATION, WITH LONG-TERM CURRENT USE OF INSULIN: ICD-10-CM

## 2025-06-15 DIAGNOSIS — E11.69 TYPE 2 DIABETES MELLITUS WITH OTHER SPECIFIED COMPLICATION, WITH LONG-TERM CURRENT USE OF INSULIN: ICD-10-CM

## 2025-06-16 RX ORDER — INSULIN GLARGINE 100 [IU]/ML
INJECTION, SOLUTION SUBCUTANEOUS
Qty: 50 ML | Refills: 0 | Status: SHIPPED | OUTPATIENT
Start: 2025-06-16

## 2025-06-18 ENCOUNTER — APPOINTMENT (OUTPATIENT)
Dept: PRIMARY CARE | Facility: CLINIC | Age: 70
End: 2025-06-18
Payer: MEDICARE

## 2025-06-18 VITALS
WEIGHT: 243.8 LBS | HEART RATE: 75 BPM | TEMPERATURE: 97.5 F | OXYGEN SATURATION: 98 % | DIASTOLIC BLOOD PRESSURE: 72 MMHG | SYSTOLIC BLOOD PRESSURE: 136 MMHG | BODY MASS INDEX: 34.9 KG/M2 | HEIGHT: 70 IN

## 2025-06-18 DIAGNOSIS — E66.811 CLASS 1 OBESITY DUE TO EXCESS CALORIES WITH SERIOUS COMORBIDITY AND BODY MASS INDEX (BMI) OF 34.0 TO 34.9 IN ADULT: ICD-10-CM

## 2025-06-18 DIAGNOSIS — Z79.4 TYPE 2 DIABETES MELLITUS WITH OTHER SPECIFIED COMPLICATION, WITH LONG-TERM CURRENT USE OF INSULIN: ICD-10-CM

## 2025-06-18 DIAGNOSIS — Z86.0101 H/O ADENOMATOUS POLYP OF COLON: ICD-10-CM

## 2025-06-18 DIAGNOSIS — C61 PROSTATE CANCER (MULTI): ICD-10-CM

## 2025-06-18 DIAGNOSIS — E55.9 VITAMIN D DEFICIENCY: ICD-10-CM

## 2025-06-18 DIAGNOSIS — Z00.00 ENCOUNTER FOR SUBSEQUENT ANNUAL WELLNESS VISIT (AWV) IN MEDICARE PATIENT: ICD-10-CM

## 2025-06-18 DIAGNOSIS — R53.83 OTHER FATIGUE: ICD-10-CM

## 2025-06-18 DIAGNOSIS — F17.210 CIGARETTE NICOTINE DEPENDENCE WITHOUT COMPLICATION: ICD-10-CM

## 2025-06-18 DIAGNOSIS — E78.00 HYPERCHOLESTEREMIA: ICD-10-CM

## 2025-06-18 DIAGNOSIS — E11.69 TYPE 2 DIABETES MELLITUS WITH OTHER SPECIFIED COMPLICATION, WITH LONG-TERM CURRENT USE OF INSULIN: ICD-10-CM

## 2025-06-18 DIAGNOSIS — Z00.00 ANNUAL PHYSICAL EXAM: Primary | ICD-10-CM

## 2025-06-18 DIAGNOSIS — I10 BENIGN ESSENTIAL HYPERTENSION: ICD-10-CM

## 2025-06-18 DIAGNOSIS — E66.09 CLASS 1 OBESITY DUE TO EXCESS CALORIES WITH SERIOUS COMORBIDITY AND BODY MASS INDEX (BMI) OF 34.0 TO 34.9 IN ADULT: ICD-10-CM

## 2025-06-18 RX ORDER — INSULIN LISPRO 100 [IU]/ML
INJECTION, SOLUTION INTRAVENOUS; SUBCUTANEOUS
Qty: 30 ML | Refills: 1 | Status: SHIPPED | OUTPATIENT
Start: 2025-06-18

## 2025-06-18 RX ORDER — BLOOD SUGAR DIAGNOSTIC
STRIP MISCELLANEOUS
Qty: 100 STRIP | Refills: 11 | Status: SHIPPED | OUTPATIENT
Start: 2025-06-18

## 2025-06-18 ASSESSMENT — PATIENT HEALTH QUESTIONNAIRE - PHQ9: 1. LITTLE INTEREST OR PLEASURE IN DOING THINGS: NOT AT ALL

## 2025-06-18 ASSESSMENT — ACTIVITIES OF DAILY LIVING (ADL)
GROCERY_SHOPPING: NEEDS ASSISTANCE
MANAGING_FINANCES: INDEPENDENT
BATHING: INDEPENDENT
DRESSING: INDEPENDENT
TAKING_MEDICATION: INDEPENDENT
DOING_HOUSEWORK: NEEDS ASSISTANCE

## 2025-06-18 ASSESSMENT — ENCOUNTER SYMPTOMS
OCCASIONAL FEELINGS OF UNSTEADINESS: 1
DEPRESSION: 0
LOSS OF SENSATION IN FEET: 1
DIZZINESS: 1

## 2025-06-18 NOTE — PROGRESS NOTES
"Subjective   Reason for Visit: Tyshawn Wilkerson is an 69 y.o. male here for a Medicare Wellness visit, yearly physical and follow up    Past Medical, Surgical, and Family History reviewed and updated in chart.    Reviewed all medications by prescribing practitioner or clinical pharmacist (such as prescriptions, OTCs, herbal therapies and supplements) and documented in the medical record.    Dizziness        Medicare wellness    Yearly physical    Prostate 12-24  Colonoscopy 9-24  recheck 25  CT chest lung cancer screening n/a  immunizations rev'd shingrix, pneumo, RSV, COVID 19  BMI 34.9    Follow up    No complaints    DM on insulin  FBS  200's this am fell to 40's last pm  Eating fast food  Follow up optho / not done yet  HBA1C 6.9  12-24  nephropathy     Abnormal TSH  TPO ok 12-24     Diarrhea resolved  H/o colon polyps  Colonoscopy 9-24 small polyps  EGD inflammation on protonix     Cold feet x chronic  Claudication sx  Vascular consult not done yet     back pain predominantly right-sided with right-sided hip pain better  Fed up with pharmacy  stopped med     Hypercholesterolemia stable on therapy no side effects     Hypertension stable on therapy no side effects     Prostate cancer  Didn't see urology     smoking discussed / marijuana  alcohol  stopped     Diet and exercise rev'd    Patient Care Team:  Jessica Moralez MD as PCP - General (Internal Medicine)  Jessica Moralez MD as PCP - [employer] PCP     Review of Systems   Neurological:  Positive for dizziness.   All other systems reviewed and are negative.      Objective   Vitals:  /72   Pulse 75   Temp 36.4 °C (97.5 °F)   Ht 1.778 m (5' 10\")   Wt 111 kg (243 lb 12.8 oz)   SpO2 98%   BMI 34.98 kg/m²       Physical Exam  Vitals reviewed.   Constitutional:       Appearance: Normal appearance. He is obese.   HENT:      Head: Normocephalic and atraumatic.      Mouth/Throat:      Pharynx: No posterior oropharyngeal erythema. "   Eyes:      General: No scleral icterus.     Conjunctiva/sclera: Conjunctivae normal.      Pupils: Pupils are equal, round, and reactive to light.   Cardiovascular:      Rate and Rhythm: Normal rate and regular rhythm.      Heart sounds: Normal heart sounds.   Pulmonary:      Effort: No respiratory distress.      Breath sounds: No wheezing.   Abdominal:      General: Abdomen is flat. Bowel sounds are normal. There is no distension.      Palpations: Abdomen is soft. There is no mass.      Tenderness: There is no abdominal tenderness. There is no rebound.   Musculoskeletal:         General: Normal range of motion.      Cervical back: Normal range of motion and neck supple.   Skin:     General: Skin is warm and dry.   Neurological:      General: No focal deficit present.      Mental Status: He is alert and oriented to person, place, and time. Mental status is at baseline.   Psychiatric:         Mood and Affect: Mood normal.         Behavior: Behavior normal.         Thought Content: Thought content normal.         Judgment: Judgment normal.         Assessment & Plan  Annual physical exam         Encounter for subsequent annual wellness visit (AWV) in Medicare patient         Type 2 diabetes mellitus with other specified complication, with long-term current use of insulin    Orders:    Accu-Chek Rosie Plus test strp; use 1 TEST STRIP to TEST BLOOD SUGAR four times a day    HumaLOG U-100 Insulin 100 unit/mL injection; Inject under the skin 3 times a day with meals. Inject 10-20 units per sliding scale three times daily.    Hemoglobin A1C; Future    Albumin-Creatinine Ratio, Urine Random; Future    Hypercholesteremia    Orders:    Comprehensive Metabolic Panel; Future    Lipid Panel; Future    TSH with reflex to Free T4 if abnormal; Future    Benign essential hypertension         H/O adenomatous polyp of colon         Prostate cancer (Multi)    Orders:    Prostate Spec.Ag,Screen; Future    Other fatigue    Orders:    CBC  and Auto Differential; Future    Cigarette nicotine dependence without complication         Vitamin D deficiency    Orders:    Vitamin D 25-Hydroxy,Total (for eval of Vitamin D levels); Future    Class 1 obesity due to excess calories with serious comorbidity and body mass index (BMI) of 34.0 to 34.9 in adult                     Medicare wellness    Yearly physical    Prostate 12-24  Colonoscopy 9-24  recheck 25  CT chest lung cancer screening n/a  immunizations rev'd shingrix, pneumo, RSV, COVID 19  BMI 34.9    Follow up    No complaints    DM on insulin  FBS  200's this am fell to 40's last pm  Eating fast food  Follow up optho / not done yet  HBA1C 6.9  12-24  nephropathy     Abnormal TSH  TPO ok 12-24     Diarrhea resolved  H/o colon polyps  Colonoscopy 9-24 small polyps  EGD inflammation on protonix     Cold feet x chronic  Claudication sx  Vascular consult not done yet     back pain predominantly right-sided with right-sided hip pain better  Fed up with pharmacy  stopped med     Hypercholesterolemia stable on therapy no side effects     Hypertension stable on therapy no side effects     Prostate cancer  Didn't see urology     smoking discussed / marijuana  alcohol  stopped  smoking and alcohol  cessation discussed   I spent more  than 3 min but less than 10 min counselling pt about need for smoking / tobacco cessation  and how I can support efforts when pt is ready to quit      Diet and exercise rev'd    Check labs before appt  Colonoscopy ordered    Follow up 3 months

## 2025-07-22 ENCOUNTER — TELEPHONE (OUTPATIENT)
Dept: GASTROENTEROLOGY | Facility: CLINIC | Age: 70
End: 2025-07-22
Payer: MEDICARE

## 2025-07-22 DIAGNOSIS — Z12.12 SCREENING FOR COLORECTAL CANCER: ICD-10-CM

## 2025-07-22 DIAGNOSIS — Z12.11 SCREENING FOR COLORECTAL CANCER: ICD-10-CM

## 2025-07-22 DIAGNOSIS — D12.6 TUBULAR ADENOMA OF COLON: Primary | ICD-10-CM

## 2025-07-22 RX ORDER — POLYETHYLENE GLYCOL 3350, SODIUM SULFATE ANHYDROUS, SODIUM BICARBONATE, SODIUM CHLORIDE, POTASSIUM CHLORIDE 236; 22.74; 6.74; 5.86; 2.97 G/4L; G/4L; G/4L; G/4L; G/4L
4000 POWDER, FOR SOLUTION ORAL ONCE
Qty: 4000 ML | Refills: 0 | Status: SHIPPED | OUTPATIENT
Start: 2025-07-22 | End: 2025-07-22

## 2025-08-31 DIAGNOSIS — E55.9 VITAMIN D DEFICIENCY, UNSPECIFIED: ICD-10-CM

## 2025-09-03 RX ORDER — ACETAMINOPHEN 500 MG
50 TABLET ORAL DAILY
Qty: 90 CAPSULE | Refills: 0 | Status: SHIPPED | OUTPATIENT
Start: 2025-09-03

## 2025-09-17 ENCOUNTER — APPOINTMENT (OUTPATIENT)
Dept: PRIMARY CARE | Facility: CLINIC | Age: 70
End: 2025-09-17
Payer: MEDICARE